# Patient Record
Sex: MALE | Race: WHITE | NOT HISPANIC OR LATINO | Employment: FULL TIME | URBAN - METROPOLITAN AREA
[De-identification: names, ages, dates, MRNs, and addresses within clinical notes are randomized per-mention and may not be internally consistent; named-entity substitution may affect disease eponyms.]

---

## 2023-08-18 PROBLEM — I10 BENIGN ESSENTIAL HYPERTENSION: Status: ACTIVE | Noted: 2023-08-18

## 2023-08-18 PROBLEM — B02.9 HERPES ZOSTER: Status: ACTIVE | Noted: 2023-08-18

## 2023-08-18 PROBLEM — M67.911 DYSFUNCTION OF RIGHT ROTATOR CUFF: Status: ACTIVE | Noted: 2023-08-18

## 2023-08-18 PROBLEM — K76.0 NONALCOHOLIC FATTY LIVER DISEASE: Status: ACTIVE | Noted: 2023-08-18

## 2023-08-18 PROBLEM — M75.101 ROTATOR CUFF TEAR, RIGHT: Status: ACTIVE | Noted: 2023-08-18

## 2023-08-18 PROBLEM — R53.83 MALAISE AND FATIGUE: Status: ACTIVE | Noted: 2023-08-18

## 2023-08-18 PROBLEM — G47.00 INSOMNIA: Status: ACTIVE | Noted: 2023-08-18

## 2023-08-18 PROBLEM — H90.41 SENSORINEURAL HEARING LOSS (SNHL) OF RIGHT EAR WITH UNRESTRICTED HEARING OF LEFT EAR: Status: ACTIVE | Noted: 2023-08-18

## 2023-08-18 PROBLEM — R53.81 MALAISE AND FATIGUE: Status: ACTIVE | Noted: 2023-08-18

## 2023-08-18 PROBLEM — F41.8 DEPRESSION WITH ANXIETY: Status: ACTIVE | Noted: 2023-08-18

## 2023-08-18 PROBLEM — K58.9 IBS (IRRITABLE BOWEL SYNDROME): Status: ACTIVE | Noted: 2023-08-18

## 2023-08-18 PROBLEM — N18.1 CHRONIC RENAL IMPAIRMENT, STAGE 1: Status: ACTIVE | Noted: 2023-08-18

## 2023-08-18 PROBLEM — N52.9 ORGANIC IMPOTENCE: Status: ACTIVE | Noted: 2023-08-18

## 2023-08-18 PROBLEM — M10.9 GOUT: Status: ACTIVE | Noted: 2023-08-18

## 2023-08-18 PROBLEM — H90.3 ASYMMETRIC SENSORINEURAL DEAFNESS: Status: ACTIVE | Noted: 2023-08-18

## 2023-08-18 PROBLEM — E78.00 HYPERCHOLESTEROLEMIA: Status: ACTIVE | Noted: 2023-08-18

## 2023-08-18 PROBLEM — E29.1 HYPOGONADISM MALE: Status: ACTIVE | Noted: 2023-08-18

## 2023-08-18 PROBLEM — K59.09 CHRONIC CONSTIPATION: Status: ACTIVE | Noted: 2023-08-18

## 2023-08-18 PROBLEM — B02.23 SHINGLES (HERPES ZOSTER) POLYNEUROPATHY: Status: ACTIVE | Noted: 2023-08-18

## 2023-08-18 RX ORDER — BUPROPION HYDROCHLORIDE 300 MG/1
300 TABLET ORAL DAILY
COMMUNITY

## 2023-08-18 RX ORDER — TRAZODONE HYDROCHLORIDE 50 MG/1
TABLET ORAL NIGHTLY PRN
COMMUNITY
Start: 2023-03-30 | End: 2023-10-27 | Stop reason: SDUPTHER

## 2023-08-18 RX ORDER — VILAZODONE HYDROCHLORIDE 40 MG/1
60 TABLET ORAL
COMMUNITY
Start: 2014-01-15

## 2023-08-18 RX ORDER — SENNOSIDES 8.6 MG/1
1 TABLET ORAL NIGHTLY
COMMUNITY
End: 2023-10-27 | Stop reason: ALTCHOICE

## 2023-08-18 RX ORDER — LINACLOTIDE 145 UG/1
CAPSULE, GELATIN COATED ORAL
COMMUNITY
Start: 2022-12-19 | End: 2023-10-27 | Stop reason: ALTCHOICE

## 2023-08-18 RX ORDER — GEMFIBROZIL 600 MG/1
600 TABLET, FILM COATED ORAL
COMMUNITY
Start: 2020-02-14 | End: 2023-10-27 | Stop reason: ALTCHOICE

## 2023-08-18 RX ORDER — OMEPRAZOLE 20 MG/1
20 CAPSULE, DELAYED RELEASE ORAL 2 TIMES DAILY
COMMUNITY

## 2023-08-18 RX ORDER — TESTOSTERONE CYPIONATE 200 MG/ML
200 INJECTION, SOLUTION INTRAMUSCULAR
COMMUNITY
Start: 2016-01-18 | End: 2024-03-07 | Stop reason: SDUPTHER

## 2023-08-18 RX ORDER — MINERAL OIL
1 ENEMA (ML) RECTAL DAILY
COMMUNITY
Start: 2022-08-10 | End: 2023-10-27 | Stop reason: ALTCHOICE

## 2023-08-18 RX ORDER — FLUTICASONE PROPIONATE 50 MCG
1 SPRAY, SUSPENSION (ML) NASAL 2 TIMES DAILY
COMMUNITY
Start: 2022-08-10

## 2023-08-18 RX ORDER — VILAZODONE HYDROCHLORIDE 40 MG/1
40 TABLET ORAL DAILY
COMMUNITY
End: 2023-10-27 | Stop reason: SDUPTHER

## 2023-08-18 RX ORDER — ZOLPIDEM TARTRATE 10 MG/1
1 TABLET ORAL NIGHTLY
COMMUNITY
Start: 2016-09-02 | End: 2023-10-27 | Stop reason: ALTCHOICE

## 2023-08-18 RX ORDER — TADALAFIL 20 MG/1
20 TABLET ORAL
COMMUNITY
Start: 2023-03-30

## 2023-08-18 RX ORDER — DAPSONE 75 MG/G
GEL TOPICAL
COMMUNITY
Start: 2022-09-29 | End: 2023-10-27 | Stop reason: ALTCHOICE

## 2023-08-18 RX ORDER — GABAPENTIN 100 MG/1
200 CAPSULE ORAL 3 TIMES DAILY
COMMUNITY
Start: 2022-07-29 | End: 2023-10-27 | Stop reason: ALTCHOICE

## 2023-09-27 LAB
ALANINE AMINOTRANSFERASE (SGPT) (U/L) IN SER/PLAS: 43 U/L (ref 10–52)
ALBUMIN (G/DL) IN SER/PLAS: 4.6 G/DL (ref 3.4–5)
ALKALINE PHOSPHATASE (U/L) IN SER/PLAS: 63 U/L (ref 33–120)
ANION GAP IN SER/PLAS: 15 MMOL/L (ref 10–20)
ASPARTATE AMINOTRANSFERASE (SGOT) (U/L) IN SER/PLAS: 28 U/L (ref 9–39)
BASOPHILS (10*3/UL) IN BLOOD BY AUTOMATED COUNT: 0.05 X10E9/L (ref 0–0.1)
BASOPHILS/100 LEUKOCYTES IN BLOOD BY AUTOMATED COUNT: 0.9 % (ref 0–2)
BILIRUBIN TOTAL (MG/DL) IN SER/PLAS: 1 MG/DL (ref 0–1.2)
CALCIUM (MG/DL) IN SER/PLAS: 10.6 MG/DL (ref 8.6–10.6)
CARBON DIOXIDE, TOTAL (MMOL/L) IN SER/PLAS: 26 MMOL/L (ref 21–32)
CHLORIDE (MMOL/L) IN SER/PLAS: 102 MMOL/L (ref 98–107)
CHOLESTEROL (MG/DL) IN SER/PLAS: 242 MG/DL (ref 0–199)
CHOLESTEROL IN HDL (MG/DL) IN SER/PLAS: 46.3 MG/DL
CHOLESTEROL/HDL RATIO: 5.2
CREATININE (MG/DL) IN SER/PLAS: 1.35 MG/DL (ref 0.5–1.3)
EOSINOPHILS (10*3/UL) IN BLOOD BY AUTOMATED COUNT: 0.27 X10E9/L (ref 0–0.7)
EOSINOPHILS/100 LEUKOCYTES IN BLOOD BY AUTOMATED COUNT: 5 % (ref 0–6)
ERYTHROCYTE DISTRIBUTION WIDTH (RATIO) BY AUTOMATED COUNT: 13.2 % (ref 11.5–14.5)
ERYTHROCYTE MEAN CORPUSCULAR HEMOGLOBIN CONCENTRATION (G/DL) BY AUTOMATED: 35.9 G/DL (ref 32–36)
ERYTHROCYTE MEAN CORPUSCULAR VOLUME (FL) BY AUTOMATED COUNT: 88 FL (ref 80–100)
ERYTHROCYTES (10*6/UL) IN BLOOD BY AUTOMATED COUNT: 5.31 X10E12/L (ref 4.5–5.9)
GFR MALE: 60 ML/MIN/1.73M2
GLUCOSE (MG/DL) IN SER/PLAS: 100 MG/DL (ref 74–99)
HEMATOCRIT (%) IN BLOOD BY AUTOMATED COUNT: 46.8 % (ref 41–52)
HEMOGLOBIN (G/DL) IN BLOOD: 16.8 G/DL (ref 13.5–17.5)
IMMATURE GRANULOCYTES/100 LEUKOCYTES IN BLOOD BY AUTOMATED COUNT: 0.4 % (ref 0–0.9)
LDL: 139 MG/DL (ref 0–99)
LEUKOCYTES (10*3/UL) IN BLOOD BY AUTOMATED COUNT: 5.4 X10E9/L (ref 4.4–11.3)
LYMPHOCYTES (10*3/UL) IN BLOOD BY AUTOMATED COUNT: 1.75 X10E9/L (ref 1.2–4.8)
LYMPHOCYTES/100 LEUKOCYTES IN BLOOD BY AUTOMATED COUNT: 32.2 % (ref 13–44)
MONOCYTES (10*3/UL) IN BLOOD BY AUTOMATED COUNT: 0.58 X10E9/L (ref 0.1–1)
MONOCYTES/100 LEUKOCYTES IN BLOOD BY AUTOMATED COUNT: 10.7 % (ref 2–10)
NEUTROPHILS (10*3/UL) IN BLOOD BY AUTOMATED COUNT: 2.77 X10E9/L (ref 1.2–7.7)
NEUTROPHILS/100 LEUKOCYTES IN BLOOD BY AUTOMATED COUNT: 50.8 % (ref 40–80)
NON HDL CHOLESTEROL: 196 MG/DL
NRBC (PER 100 WBCS) BY AUTOMATED COUNT: 0 /100 WBC (ref 0–0)
PLATELETS (10*3/UL) IN BLOOD AUTOMATED COUNT: 184 X10E9/L (ref 150–450)
POTASSIUM (MMOL/L) IN SER/PLAS: 4.3 MMOL/L (ref 3.5–5.3)
PROSTATE SPECIFIC AG (NG/ML) IN SER/PLAS: 1.16 NG/ML (ref 0–4)
PROTEIN TOTAL: 7.2 G/DL (ref 6.4–8.2)
SODIUM (MMOL/L) IN SER/PLAS: 139 MMOL/L (ref 136–145)
THYROTROPIN (MIU/L) IN SER/PLAS BY DETECTION LIMIT <= 0.05 MIU/L: 1 MIU/L (ref 0.44–3.98)
THYROXINE (T4) FREE (NG/DL) IN SER/PLAS: 1.14 NG/DL (ref 0.78–1.48)
TRIGLYCERIDE (MG/DL) IN SER/PLAS: 284 MG/DL (ref 0–149)
UREA NITROGEN (MG/DL) IN SER/PLAS: 16 MG/DL (ref 6–23)
VLDL: 57 MG/DL (ref 0–40)

## 2023-09-29 LAB
6-ACETYLMORPHINE: <25 NG/ML
7-AMINOCLONAZEPAM: <25 NG/ML
ALPHA-HYDROXYALPRAZOLAM: <25 NG/ML
ALPHA-HYDROXYMIDAZOLAM: <25 NG/ML
ALPRAZOLAM: <25 NG/ML
AMPHETAMINE (PRESENCE) IN URINE BY SCREEN METHOD: ABNORMAL
BARBITURATES PRESENCE IN URINE BY SCREEN METHOD: ABNORMAL
CANNABINOIDS IN URINE BY SCREEN METHOD: ABNORMAL
CHLORDIAZEPOXIDE: <25 NG/ML
CLONAZEPAM: <25 NG/ML
COCAINE (PRESENCE) IN URINE BY SCREEN METHOD: ABNORMAL
CODEINE: <50 NG/ML
CREATINE, URINE FOR DRUG: 125.6 MG/DL
DIAZEPAM: <25 NG/ML
DRUG SCREEN COMMENT URINE: ABNORMAL
EDDP: <25 NG/ML
FENTANYL CONFIRMATION, URINE: <2.5 NG/ML
HYDROCODONE: <25 NG/ML
HYDROMORPHONE: <25 NG/ML
LORAZEPAM: <25 NG/ML
METHADONE CONFIRMATION,URINE: <25 NG/ML
MIDAZOLAM: <25 NG/ML
MORPHINE URINE: <50 NG/ML
NORDIAZEPAM: <25 NG/ML
NORFENTANYL: <2.5 NG/ML
NORHYDROCODONE: <25 NG/ML
NOROXYCODONE: <25 NG/ML
O-DESMETHYLTRAMADOL: <50 NG/ML
OXAZEPAM: <25 NG/ML
OXYCODONE: <25 NG/ML
OXYMORPHONE: <25 NG/ML
PHENCYCLIDINE (PRESENCE) IN URINE BY SCREEN METHOD: ABNORMAL
TEMAZEPAM: <25 NG/ML
TRAMADOL: <50 NG/ML
ZOLPIDEM METABOLITE (ZCA): <25 NG/ML
ZOLPIDEM: <25 NG/ML

## 2023-10-02 LAB — 11-NOR-9-CARBOXY-THC, URN, QUANT: 370 NG/ML

## 2023-10-03 ENCOUNTER — TELEPHONE (OUTPATIENT)
Dept: PRIMARY CARE | Facility: CLINIC | Age: 58
End: 2023-10-03
Payer: COMMERCIAL

## 2023-10-27 ENCOUNTER — OFFICE VISIT (OUTPATIENT)
Dept: PRIMARY CARE | Facility: CLINIC | Age: 58
End: 2023-10-27
Payer: COMMERCIAL

## 2023-10-27 VITALS
HEIGHT: 68 IN | RESPIRATION RATE: 16 BRPM | SYSTOLIC BLOOD PRESSURE: 128 MMHG | HEART RATE: 72 BPM | DIASTOLIC BLOOD PRESSURE: 72 MMHG | WEIGHT: 202.82 LBS | BODY MASS INDEX: 30.74 KG/M2

## 2023-10-27 DIAGNOSIS — R53.83 MALAISE AND FATIGUE: ICD-10-CM

## 2023-10-27 DIAGNOSIS — N18.1 CHRONIC RENAL IMPAIRMENT, STAGE 1: ICD-10-CM

## 2023-10-27 DIAGNOSIS — I10 BENIGN ESSENTIAL HYPERTENSION: Primary | ICD-10-CM

## 2023-10-27 DIAGNOSIS — K76.0 NONALCOHOLIC FATTY LIVER DISEASE: ICD-10-CM

## 2023-10-27 DIAGNOSIS — E78.00 HYPERCHOLESTEROLEMIA: ICD-10-CM

## 2023-10-27 DIAGNOSIS — F51.01 PRIMARY INSOMNIA: ICD-10-CM

## 2023-10-27 DIAGNOSIS — R53.81 MALAISE AND FATIGUE: ICD-10-CM

## 2023-10-27 DIAGNOSIS — E29.1 HYPOGONADISM MALE: ICD-10-CM

## 2023-10-27 DIAGNOSIS — K59.09 CHRONIC CONSTIPATION: ICD-10-CM

## 2023-10-27 PROBLEM — R05.9 COUGH: Status: RESOLVED | Noted: 2023-10-27 | Resolved: 2023-10-27

## 2023-10-27 PROCEDURE — 3078F DIAST BP <80 MM HG: CPT | Performed by: INTERNAL MEDICINE

## 2023-10-27 PROCEDURE — 3074F SYST BP LT 130 MM HG: CPT | Performed by: INTERNAL MEDICINE

## 2023-10-27 PROCEDURE — 99214 OFFICE O/P EST MOD 30 MIN: CPT | Performed by: INTERNAL MEDICINE

## 2023-10-27 PROCEDURE — 1036F TOBACCO NON-USER: CPT | Performed by: INTERNAL MEDICINE

## 2023-10-27 RX ORDER — TRAZODONE HYDROCHLORIDE 50 MG/1
100 TABLET ORAL NIGHTLY PRN
Qty: 180 TABLET | Refills: 1 | Status: SHIPPED | OUTPATIENT
Start: 2023-10-27

## 2023-10-27 ASSESSMENT — ENCOUNTER SYMPTOMS
RESPIRATORY NEGATIVE: 1
CARDIOVASCULAR NEGATIVE: 1
ALLERGIC/IMMUNOLOGIC NEGATIVE: 1
MUSCULOSKELETAL NEGATIVE: 1
CONSTITUTIONAL NEGATIVE: 1
GASTROINTESTINAL NEGATIVE: 1
NEUROLOGICAL NEGATIVE: 1
EYES NEGATIVE: 1
PSYCHIATRIC NEGATIVE: 1
ENDOCRINE NEGATIVE: 1
HEMATOLOGIC/LYMPHATIC NEGATIVE: 1

## 2023-10-27 NOTE — ASSESSMENT & PLAN NOTE
HTN - hypertension well/controlled .Target BP < 130/80  achieved. Educate low salt diet and exercise with weight loss. Educate home self monitoring and diary keeping. Educate risks of elevate blood pressure and benefits of prompt treatment.

## 2023-10-27 NOTE — ASSESSMENT & PLAN NOTE
Fatigue - check CMP(metabolic panel and elctrolytes) , CBC(complete blood cell count), TSH(thyroid function). Insomnia may play a role and sleep studies(rule out sleep apnea) are recommended . Educate sleep hygiene. Consider anxiety disorder vs. depression. Consider Stress test, and 2DECHO.

## 2023-10-27 NOTE — ASSESSMENT & PLAN NOTE
Hypercholesterolemia - Monitor lipid profile and educate patient upon risks of high cholesterol and targets. Educate diet and change in lifestyle and increase in exercises -   and educate compliance with medication and diet.

## 2023-10-27 NOTE — ASSESSMENT & PLAN NOTE
Hypogonadism  - monitor  Testosterone level  - and supplement - injecting himself Testosterone 200 mg IM once a week Repeat on a weekly basis. Monitor Testosterone level and symptoms ( fatigue, decrease libido, muscle weakness). Educate the patient upon the risks of Stroke, heart attacks and possible venous thromboembolism with possible pulmonary embolism and even death associated with Testosterone treatment .  The patient understood the risks associated with Testosterone treatment and whishes to continue the Testosterone supplement and aggress with a plan to control tightly the Blood pressure and cholesterol and take a Baby aspirin 81 mg daily to prevent these complications. He enjoys the benefits of the Testosterone. Also check Prolactin and LH and FSH levels to rule out pituitary adenoma and prolactinoma.

## 2023-11-14 ENCOUNTER — OFFICE VISIT (OUTPATIENT)
Dept: PRIMARY CARE | Facility: CLINIC | Age: 58
End: 2023-11-14
Payer: COMMERCIAL

## 2023-11-14 ENCOUNTER — TELEPHONE (OUTPATIENT)
Dept: PRIMARY CARE | Facility: CLINIC | Age: 58
End: 2023-11-14

## 2023-11-14 VITALS
DIASTOLIC BLOOD PRESSURE: 80 MMHG | WEIGHT: 195 LBS | RESPIRATION RATE: 18 BRPM | BODY MASS INDEX: 29.55 KG/M2 | SYSTOLIC BLOOD PRESSURE: 120 MMHG | HEART RATE: 78 BPM | HEIGHT: 68 IN

## 2023-11-14 DIAGNOSIS — J98.01 COUGH DUE TO BRONCHOSPASM: ICD-10-CM

## 2023-11-14 DIAGNOSIS — R09.82 POSTNASAL DRIP: ICD-10-CM

## 2023-11-14 DIAGNOSIS — J01.00 ACUTE NON-RECURRENT MAXILLARY SINUSITIS: Primary | ICD-10-CM

## 2023-11-14 PROCEDURE — 3079F DIAST BP 80-89 MM HG: CPT | Performed by: INTERNAL MEDICINE

## 2023-11-14 PROCEDURE — 99213 OFFICE O/P EST LOW 20 MIN: CPT | Performed by: INTERNAL MEDICINE

## 2023-11-14 PROCEDURE — 3074F SYST BP LT 130 MM HG: CPT | Performed by: INTERNAL MEDICINE

## 2023-11-14 PROCEDURE — 1036F TOBACCO NON-USER: CPT | Performed by: INTERNAL MEDICINE

## 2023-11-14 RX ORDER — ZOLPIDEM TARTRATE 10 MG/1
10 TABLET ORAL DAILY PRN
COMMUNITY
Start: 2023-11-06 | End: 2023-12-06

## 2023-11-14 RX ORDER — BENZONATATE 200 MG/1
200 CAPSULE ORAL 3 TIMES DAILY PRN
Qty: 42 CAPSULE | Refills: 0 | Status: SHIPPED | OUTPATIENT
Start: 2023-11-14 | End: 2023-12-14

## 2023-11-14 RX ORDER — AMOXICILLIN AND CLAVULANATE POTASSIUM 875; 125 MG/1; MG/1
875 TABLET, FILM COATED ORAL 2 TIMES DAILY
Qty: 20 TABLET | Refills: 0 | Status: SHIPPED | OUTPATIENT
Start: 2023-11-14 | End: 2023-11-24

## 2023-11-14 NOTE — ASSESSMENT & PLAN NOTE
start Augmentin 875 mg BID for 10 days   And Flonase and Claritin and saline spray vitamin B and C and D

## 2023-11-14 NOTE — PROGRESS NOTES
"Subjective   Patient ID: Tobi Dominguez is a 58 y.o. male who presents for Cough (Cough, sinus congestion, fatigue).and Postnasal drip due to chronic sinusitis - days and Flonase I BID and Mucinex and Allegra 180- mg daily [      HPI     Review of Systems    Objective   Ht 1.727 m (5' 8\")   Wt 88.5 kg (195 lb)   BMI 29.65 kg/m²   Blood pressure 120/80, pulse 78, resp. rate 18, height 1.727 m (5' 8\"), weight 88.5 kg (195 lb).   Physical Exam    Assessment/Plan   Problem List Items Addressed This Visit             ICD-10-CM    Acute non-recurrent maxillary sinusitis - Primary J01.00     Sinusitis - start Augmentin 875 mg BID for 10 days   And Flonase and Claritin and saline spray vitamin B and C and D          Cough due to bronchospasm J98.01     Bronchitis with wheezing                                           Recommend:                                                                                                      mild  Pharyngitis,                                                                                                                                                                               Start: start Augmentin 875 mg BID for 10 days   And Flonase and Claritin and saline spray vitamin B and C and D -                                                                                                                                                             Asthma/COPD exacerbation       Allegra 180mg qD vs. Claritin 10 mg qD and Mucinex start Augmentin 875 mg BID for 10 days   And Flonase and Claritin and saline spray vitamin B and C and D                                                                                                                                             Cough  - start -  Avoid cold exposure and take vitamins C 500 and B complex  qD   start Augmentin 875 mg BID for 10 days   And Flonase and Claritin and saline spray vitamin B and C and D     Sinusitis - allergic vs. " infectious - start Allegra and Flonase I BID and Azithromycin 500 mg qD for 6 days. and avoid cold exposure.           Relevant Medications    amoxicillin-pot clavulanate (Augmentin) 875-125 mg tablet    Other Relevant Orders    XR chest 2 views    Postnasal drip R09.82     start Augmentin 875 mg BID for 10 days   And Flonase and Claritin and saline spray vitamin B and C and D

## 2023-11-14 NOTE — ASSESSMENT & PLAN NOTE
Bronchitis with wheezing                                           Recommend:                                                                                                      mild  Pharyngitis,                                                                                                                                                                               Start: start Augmentin 875 mg BID for 10 days   And Flonase and Claritin and saline spray vitamin B and C and D -                                                                                                                                                             Asthma/COPD exacerbation       Allegra 180mg qD vs. Claritin 10 mg qD and Mucinex start Augmentin 875 mg BID for 10 days   And Flonase and Claritin and saline spray vitamin B and C and D                                                                                                                                             Cough  - start -  Avoid cold exposure and take vitamins C 500 and B complex  qD   start Augmentin 875 mg BID for 10 days   And Flonase and Claritin and saline spray vitamin B and C and D     Sinusitis - allergic vs. infectious - start Allegra and Flonase I BID and Azithromycin 500 mg qD for 6 days. and avoid cold exposure.

## 2023-11-14 NOTE — ASSESSMENT & PLAN NOTE
Sinusitis - start Augmentin 875 mg BID for 10 days   And Flonase and Claritin and saline spray vitamin B and C and D

## 2023-12-04 ENCOUNTER — OFFICE VISIT (OUTPATIENT)
Dept: PRIMARY CARE | Facility: CLINIC | Age: 58
End: 2023-12-04
Payer: COMMERCIAL

## 2023-12-04 VITALS
WEIGHT: 195 LBS | DIASTOLIC BLOOD PRESSURE: 72 MMHG | SYSTOLIC BLOOD PRESSURE: 128 MMHG | HEART RATE: 72 BPM | HEIGHT: 68 IN | BODY MASS INDEX: 29.55 KG/M2 | RESPIRATION RATE: 16 BRPM

## 2023-12-04 DIAGNOSIS — F41.8 DEPRESSION WITH ANXIETY: ICD-10-CM

## 2023-12-04 DIAGNOSIS — G47.09 OTHER INSOMNIA: ICD-10-CM

## 2023-12-04 DIAGNOSIS — Z00.00 ANNUAL PHYSICAL EXAM: ICD-10-CM

## 2023-12-04 DIAGNOSIS — E29.1 HYPOGONADISM MALE: Primary | ICD-10-CM

## 2023-12-04 DIAGNOSIS — E78.00 HYPERCHOLESTEROLEMIA: ICD-10-CM

## 2023-12-04 DIAGNOSIS — K76.0 NONALCOHOLIC FATTY LIVER DISEASE: ICD-10-CM

## 2023-12-04 DIAGNOSIS — K58.1 IRRITABLE BOWEL SYNDROME WITH CONSTIPATION: ICD-10-CM

## 2023-12-04 PROCEDURE — 3074F SYST BP LT 130 MM HG: CPT | Performed by: INTERNAL MEDICINE

## 2023-12-04 PROCEDURE — 99396 PREV VISIT EST AGE 40-64: CPT | Performed by: INTERNAL MEDICINE

## 2023-12-04 PROCEDURE — 3078F DIAST BP <80 MM HG: CPT | Performed by: INTERNAL MEDICINE

## 2023-12-04 PROCEDURE — 1036F TOBACCO NON-USER: CPT | Performed by: INTERNAL MEDICINE

## 2023-12-04 RX ORDER — ONDANSETRON 8 MG/1
TABLET, ORALLY DISINTEGRATING ORAL
COMMUNITY
Start: 2023-12-01

## 2023-12-04 ASSESSMENT — PROMIS GLOBAL HEALTH SCALE
CARRYOUT_PHYSICAL_ACTIVITIES: COMPLETELY
CARRYOUT_SOCIAL_ACTIVITIES: EXCELLENT
RATE_QUALITY_OF_LIFE: VERY GOOD
RATE_AVERAGE_PAIN: 0
EMOTIONAL_PROBLEMS: RARELY
RATE_AVERAGE_FATIGUE: MILD
RATE_MENTAL_HEALTH: VERY GOOD
RATE_SOCIAL_SATISFACTION: VERY GOOD
RATE_GENERAL_HEALTH: VERY GOOD
RATE_PHYSICAL_HEALTH: VERY GOOD

## 2023-12-04 ASSESSMENT — ENCOUNTER SYMPTOMS
CONSTITUTIONAL NEGATIVE: 1
EYES NEGATIVE: 1
RESPIRATORY NEGATIVE: 1
CARDIOVASCULAR NEGATIVE: 1
PSYCHIATRIC NEGATIVE: 1
HEMATOLOGIC/LYMPHATIC NEGATIVE: 1
NEUROLOGICAL NEGATIVE: 1
GASTROINTESTINAL NEGATIVE: 1
ALLERGIC/IMMUNOLOGIC NEGATIVE: 1
ENDOCRINE NEGATIVE: 1
MUSCULOSKELETAL NEGATIVE: 1

## 2023-12-04 NOTE — PROGRESS NOTES
"Subjective   Patient ID: Tobi Dominguez is a 58 y.o. male who presents for Annual Exam (CPE).    HPI     Review of Systems   Constitutional: Negative.    HENT: Negative.     Eyes: Negative.    Respiratory: Negative.     Cardiovascular: Negative.    Gastrointestinal: Negative.    Endocrine: Negative.    Musculoskeletal: Negative.    Skin: Negative.    Allergic/Immunologic: Negative.    Neurological: Negative.    Hematological: Negative.    Psychiatric/Behavioral: Negative.     All other systems reviewed and are negative.      Objective   Ht 1.727 m (5' 8\")   Wt 88.5 kg (195 lb)   BMI 29.65 kg/m²   Blood pressure 128/72, pulse 72, resp. rate 16, height 1.727 m (5' 8\"), weight 88.5 kg (195 lb).   Physical Exam  Vitals and nursing note reviewed.   Constitutional:       Appearance: Normal appearance.   HENT:      Head: Normocephalic and atraumatic.      Right Ear: Tympanic membrane, ear canal and external ear normal.      Left Ear: Tympanic membrane, ear canal and external ear normal. There is no impacted cerumen.      Nose: Nose normal.      Mouth/Throat:      Mouth: Mucous membranes are moist.      Pharynx: Oropharynx is clear.   Eyes:      Extraocular Movements: Extraocular movements intact.      Conjunctiva/sclera: Conjunctivae normal.      Pupils: Pupils are equal, round, and reactive to light.   Cardiovascular:      Rate and Rhythm: Normal rate and regular rhythm.      Pulses: Normal pulses.      Heart sounds: Normal heart sounds. No murmur heard.  Pulmonary:      Effort: Pulmonary effort is normal. No respiratory distress.      Breath sounds: Normal breath sounds. No stridor. No wheezing, rhonchi or rales.   Chest:      Chest wall: No tenderness.   Abdominal:      General: Abdomen is flat. Bowel sounds are normal. There is no distension.      Palpations: Abdomen is soft. There is no mass.      Tenderness: There is no abdominal tenderness. There is no right CVA tenderness, left CVA tenderness, guarding or rebound. "      Hernia: No hernia is present.   Musculoskeletal:         General: Normal range of motion.      Cervical back: Normal range of motion and neck supple.   Skin:     General: Skin is warm.      Capillary Refill: Capillary refill takes less than 2 seconds.   Neurological:      General: No focal deficit present.      Mental Status: He is alert.      Cranial Nerves: No cranial nerve deficit.      Sensory: No sensory deficit.      Motor: No weakness.      Coordination: Coordination normal.      Gait: Gait normal.      Deep Tendon Reflexes: Reflexes normal.   Psychiatric:         Mood and Affect: Mood normal.         Behavior: Behavior normal. Behavior is cooperative.         Thought Content: Thought content normal.         Judgment: Judgment normal.         Assessment/Plan   Problem List Items Addressed This Visit             ICD-10-CM    Depression with anxiety F41.8     Anxiety Disorder - +/- Depresion.  the patient extensively. Prescribe /Wellbutrin /                                                   Hypercholesterolemia E78.00     Hypercholesterolemia - Monitor lipid profile and educate patient upon risks of high cholesterol and targets. Educate diet and change in lifestyle and increase in exercises -   and educate compliance with medication and diet.            Hypogonadism male - Primary E29.1     Hypogonadism  - monitor  Testosterone level  - and supplement - injecting himself Testosterone 200 mg IM once a week Repeat on a weekly basis. Monitor Testosterone level and symptoms ( fatigue, decrease libido, muscle weakness). Educate the patient upon the risks of Stroke, heart attacks and possible venous thromboembolism with possible pulmonary embolism and even death associated with Testosterone treatment .  The patient understood the risks associated with Testosterone treatment and whishes to continue the Testosterone supplement and aggress with a plan to control tightly the Blood pressure and cholesterol and  take a Baby aspirin 81 mg daily to prevent these complications. He enjoys the benefits of the Testosterone. Also check Prolactin and LH and FSH levels to rule out pituitary adenoma and prolactinoma.          Insomnia G47.00     Insomnia - Educate sleep hygiene, avoid naps. Avoid excessive coffee or chocolate. Consider relaxation techniques. Start initially Melatonin and Tylenol 500 mg at bed time. Refill Trazodone 50 mg daily and Zolpidem 10 mg PRN          Nonalcoholic fatty liver disease K76.0     Monitor liver function and educate diet due to fatty liver disease         IBS (irritable bowel syndrome) K58.9     Taking Linzess and it is improved feels better          Annual physical exam Z00.00     No recent hospitalizations.    All medications reviewed and reconciled by me the provider..  No use of controlled substances or opiates.    Family history, social history reviewed, no changes.    Patient does not smoke.    Patient does not drink.    Patient hydrates adequately daily.  Eats a well-balanced healthy diet.     Exercises adequately including walking and doing weightbearing exercises.    Patient denies any difficulty with memory or cognition.     Denies any difficulty with hearing.  Patient does not wear hearing aids.    No fall risk.  No recent falls.  Denies any difficulty walking.    Patient with no history of depression anxiety, denies any loss of interest, no feeling of sadness, no lack of motivation.    Patient is independent in all ADLs and IADLs.  Independent bathing, dressing, walking.  Takes care of own finances, shopping and cooking.     Preventive measures - Recommend ASAP : Last Colonoscopy within the last 5 years an it was normal no polyps  (educate patient risks of colon cancer) refer patient to GI specialist.    BPH - (Benign Prostatic Hypertrophy) refer patient to an Urologist for rectal exam and PSA check.     Had all the Covid vaccines and Shingrix

## 2023-12-04 NOTE — ASSESSMENT & PLAN NOTE
Anxiety Disorder - +/- Depresion.  the patient extensively. Prescribe /Wellbutrin /

## 2023-12-04 NOTE — ASSESSMENT & PLAN NOTE
Insomnia - Educate sleep hygiene, avoid naps. Avoid excessive coffee or chocolate. Consider relaxation techniques. Start initially Melatonin and Tylenol 500 mg at bed time. Refill Trazodone 50 mg daily and Zolpidem 10 mg PRN

## 2024-03-01 ENCOUNTER — TELEPHONE (OUTPATIENT)
Dept: PRIMARY CARE | Facility: CLINIC | Age: 59
End: 2024-03-01
Payer: COMMERCIAL

## 2024-03-01 DIAGNOSIS — J98.01 COUGH DUE TO BRONCHOSPASM: Primary | ICD-10-CM

## 2024-03-01 RX ORDER — AZITHROMYCIN 500 MG/1
500 TABLET, FILM COATED ORAL DAILY
Qty: 5 TABLET | Refills: 0 | Status: SHIPPED | OUTPATIENT
Start: 2024-03-01 | End: 2024-03-06

## 2024-03-07 ENCOUNTER — OFFICE VISIT (OUTPATIENT)
Dept: PRIMARY CARE | Facility: CLINIC | Age: 59
End: 2024-03-07
Payer: COMMERCIAL

## 2024-03-07 VITALS
HEIGHT: 68 IN | SYSTOLIC BLOOD PRESSURE: 110 MMHG | BODY MASS INDEX: 28.04 KG/M2 | WEIGHT: 185 LBS | HEART RATE: 65 BPM | DIASTOLIC BLOOD PRESSURE: 65 MMHG | RESPIRATION RATE: 16 BRPM

## 2024-03-07 DIAGNOSIS — I10 BENIGN ESSENTIAL HYPERTENSION: ICD-10-CM

## 2024-03-07 DIAGNOSIS — E29.1 HYPOGONADISM MALE: ICD-10-CM

## 2024-03-07 DIAGNOSIS — J98.01 COUGH DUE TO BRONCHOSPASM: Primary | ICD-10-CM

## 2024-03-07 PROCEDURE — 3078F DIAST BP <80 MM HG: CPT | Performed by: INTERNAL MEDICINE

## 2024-03-07 PROCEDURE — 3074F SYST BP LT 130 MM HG: CPT | Performed by: INTERNAL MEDICINE

## 2024-03-07 PROCEDURE — 99213 OFFICE O/P EST LOW 20 MIN: CPT | Performed by: INTERNAL MEDICINE

## 2024-03-07 PROCEDURE — 1036F TOBACCO NON-USER: CPT | Performed by: INTERNAL MEDICINE

## 2024-03-07 RX ORDER — AMOXICILLIN AND CLAVULANATE POTASSIUM 875; 125 MG/1; MG/1
875 TABLET, FILM COATED ORAL 2 TIMES DAILY
Qty: 20 TABLET | Refills: 0 | Status: SHIPPED | OUTPATIENT
Start: 2024-03-07 | End: 2024-03-17

## 2024-03-07 RX ORDER — ZOLPIDEM TARTRATE 10 MG/1
10 TABLET ORAL DAILY PRN
COMMUNITY
Start: 2023-12-28 | End: 2024-04-03

## 2024-03-07 RX ORDER — TESTOSTERONE CYPIONATE 200 MG/ML
200 INJECTION, SOLUTION INTRAMUSCULAR
Qty: 10 ML | Refills: 2 | Status: SHIPPED | OUTPATIENT
Start: 2024-03-07

## 2024-03-07 RX ORDER — PREDNISONE 10 MG/1
TABLET ORAL
Qty: 30 TABLET | Refills: 0 | Status: SHIPPED | OUTPATIENT
Start: 2024-03-07 | End: 2024-03-16

## 2024-03-07 RX ORDER — BENZONATATE 200 MG/1
200 CAPSULE ORAL 3 TIMES DAILY PRN
Qty: 42 CAPSULE | Refills: 0 | Status: SHIPPED | OUTPATIENT
Start: 2024-03-07 | End: 2024-04-06

## 2024-03-07 ASSESSMENT — ENCOUNTER SYMPTOMS
CARDIOVASCULAR NEGATIVE: 1
COUGH: 1
EYES NEGATIVE: 1
GASTROINTESTINAL NEGATIVE: 1
ALLERGIC/IMMUNOLOGIC NEGATIVE: 1
NEUROLOGICAL NEGATIVE: 1
PSYCHIATRIC NEGATIVE: 1
HEMATOLOGIC/LYMPHATIC NEGATIVE: 1
CONSTITUTIONAL NEGATIVE: 1
MUSCULOSKELETAL NEGATIVE: 1
ENDOCRINE NEGATIVE: 1

## 2024-03-07 NOTE — PROGRESS NOTES
"Subjective   Patient ID: Tobi Dominguez is a 58 y.o. male who presents for Cough (Cough x3-4 weeks, no improvement after azithromycin ). No check for Covid or Flu  - took on course of azithromycin and the cough recurs and took a second \"Z \" pack and sinuses are stuill congested and persistent cough form postnasal drip     Cough         Review of Systems   Constitutional: Negative.    HENT: Negative.     Eyes: Negative.    Respiratory:  Positive for cough.    Cardiovascular: Negative.    Gastrointestinal: Negative.    Endocrine: Negative.    Musculoskeletal: Negative.    Skin: Negative.    Allergic/Immunologic: Negative.    Neurological: Negative.    Hematological: Negative.    Psychiatric/Behavioral: Negative.     All other systems reviewed and are negative.      Objective   Ht 1.727 m (5' 8\")   Wt 83.9 kg (185 lb)   BMI 28.13 kg/m²   Blood pressure 110/65, pulse 65, resp. rate 16, height 1.727 m (5' 8\"), weight 83.9 kg (185 lb).   Physical Exam  Vitals and nursing note reviewed.   Constitutional:       Appearance: Normal appearance.   HENT:      Head: Normocephalic and atraumatic.      Right Ear: Tympanic membrane, ear canal and external ear normal.      Left Ear: Tympanic membrane, ear canal and external ear normal. There is no impacted cerumen.      Nose: Nose normal.      Mouth/Throat:      Mouth: Mucous membranes are moist.      Pharynx: Oropharynx is clear.   Eyes:      Extraocular Movements: Extraocular movements intact.      Conjunctiva/sclera: Conjunctivae normal.      Pupils: Pupils are equal, round, and reactive to light.   Cardiovascular:      Rate and Rhythm: Normal rate and regular rhythm.      Pulses: Normal pulses.      Heart sounds: Normal heart sounds. No murmur heard.  Pulmonary:      Effort: Pulmonary effort is normal. No respiratory distress.      Breath sounds: Normal breath sounds. No stridor. No wheezing, rhonchi or rales.   Chest:      Chest wall: No tenderness.   Abdominal:      General: " Abdomen is flat. Bowel sounds are normal. There is no distension.      Palpations: Abdomen is soft. There is no mass.      Tenderness: There is no abdominal tenderness. There is no right CVA tenderness, left CVA tenderness, guarding or rebound.      Hernia: No hernia is present.   Musculoskeletal:         General: Normal range of motion.      Cervical back: Normal range of motion and neck supple.   Skin:     General: Skin is warm.      Capillary Refill: Capillary refill takes less than 2 seconds.   Neurological:      General: No focal deficit present.      Mental Status: He is alert.      Cranial Nerves: No cranial nerve deficit.      Sensory: No sensory deficit.      Motor: No weakness.      Coordination: Coordination normal.      Gait: Gait normal.      Deep Tendon Reflexes: Reflexes normal.   Psychiatric:         Mood and Affect: Mood normal.         Behavior: Behavior normal. Behavior is cooperative.         Thought Content: Thought content normal.         Judgment: Judgment normal.         Assessment/Plan   Problem List Items Addressed This Visit             ICD-10-CM    Benign essential hypertension I10    Hypogonadism male E29.1    Relevant Medications    testosterone cypionate (Depo-Testosterone) 200 mg/mL injection    Cough due to bronchospasm - Primary J98.01     Bronchitis with wheezing                                           Recommend:                                                                                                      mild  Pharyngitis,                                                                                                                                                                               Start: start Augmentin 875 mg BID for 10 days   And Flonase and Claritin and saline spray vitamin B and C and D - and Prednisone 10 mg II BID for 3 days and II qD for 3 days and I qD for 3 days -                                                                                                                                                                Asthma/COPD exacerbation       Allegra 180mg qD vs. Claritin 10 mg qD and Mucinex start Augmentin 875 mg BID for 10 days and Prednisone 10 mg II BID for 3 days and II qD for 3 days and I qD for 3 days -     And Flonase and Claritin and saline spray vitamin B and C and D                                                                                                                                              Cough  - start -  Avoid cold exposure and take vitamins C 500 and B complex  qD   start Augmentin 875 mg BID for 10 days   And Flonase and Claritin and saline spray vitamin B and C and D      Sinusitis - allergic vs. infectious - start Allegra and Flonase I BID and and Prednisone 10 mg II BID for 3 days and II qD for 3 days and I qD for 3 days -   s. and avoid cold exposure.              Depression with anxiety F41.8        Anxiety Disorder - +/- Depresion.  the patient extensively. Prescribe /Wellbutrin /                                                     Hypercholesterolemia E78.00       Hypercholesterolemia - Monitor lipid profile and educate patient upon risks of high cholesterol and targets. Educate diet and change in lifestyle and increase in exercises -   and educate compliance with medication and diet.              Hypogonadism male - Primary E29.1       Hypogonadism  - monitor  Testosterone level  - and supplement - injecting himself Testosterone 200 mg IM once a week Repeat on a weekly basis. Monitor Testosterone level and symptoms ( fatigue, decrease libido, muscle weakness). Educate the patient upon the risks of Stroke, heart attacks and possible venous thromboembolism with possible pulmonary embolism and even death associated with Testosterone treatment .  The patient understood the risks associated with Testosterone treatment and whishes to continue the Testosterone supplement and aggress with a plan to control  tightly the Blood pressure and cholesterol and take a Baby aspirin 81 mg daily to prevent these complications. He enjoys the benefits of the Testosterone. Also check Prolactin and LH and FSH levels to rule out pituitary adenoma and prolactinoma.            Insomnia G47.00       Insomnia - Educate sleep hygiene, avoid naps. Avoid excessive coffee or chocolate. Consider relaxation techniques. Start initially Melatonin and Tylenol 500 mg at bed time. Refill Trazodone 50 mg daily and Zolpidem 10 mg PRN            Nonalcoholic fatty liver disease K76.0       Monitor liver function and educate diet due to fatty liver disease           IBS (irritable bowel syndrome) K58.9       Taking Linzess and it is improved feels better                                       Immunizations/Injections     Flu vaccine (IIV4), preservative free *Check age/dose*10/6/2013  Influenza, seasonal, injectable9/26/2011  Moderna SARS-CoV-2 [id number]/7/2021, 3/10/2021, 2/10/2021

## 2024-03-07 NOTE — ASSESSMENT & PLAN NOTE
Bronchitis with wheezing                                           Recommend:                                                                                                      mild  Pharyngitis,                                                                                                                                                                               Start: start Augmentin 875 mg BID for 10 days   And Flonase and Claritin and saline spray vitamin B and C and D - and Prednisone 10 mg II BID for 3 days and II qD for 3 days and I qD for 3 days -                                                                                                                                                               Asthma/COPD exacerbation       Allegra 180mg qD vs. Claritin 10 mg qD and Mucinex start Augmentin 875 mg BID for 10 days and Prednisone 10 mg II BID for 3 days and II qD for 3 days and I qD for 3 days -     And Flonase and Claritin and saline spray vitamin B and C and D                                                                                                                                              Cough  - start -  Avoid cold exposure and take vitamins C 500 and B complex  qD   start Augmentin 875 mg BID for 10 days   And Flonase and Claritin and saline spray vitamin B and C and D      Sinusitis - allergic vs. infectious - start Allegra and Flonase I BID and and Prednisone 10 mg II BID for 3 days and II qD for 3 days and I qD for 3 days -   s. and avoid cold exposure.

## 2024-03-12 ENCOUNTER — OFFICE VISIT (OUTPATIENT)
Dept: PRIMARY CARE | Facility: CLINIC | Age: 59
End: 2024-03-12
Payer: COMMERCIAL

## 2024-03-12 DIAGNOSIS — Z71.9 ENCOUNTER FOR CONSULTATION: Primary | ICD-10-CM

## 2024-03-12 PROCEDURE — UHSMG PR UH SELECT MEET AND GREET: Performed by: INTERNAL MEDICINE

## 2024-03-12 PROCEDURE — 1036F TOBACCO NON-USER: CPT | Performed by: INTERNAL MEDICINE

## 2024-03-12 NOTE — PROGRESS NOTES
Patient presents for meet/greet  Information regarding practice reviewed.  Recommend wellness exam in May/June if patient joins    Kranthi Ríos MD

## 2024-03-18 ENCOUNTER — TELEPHONE (OUTPATIENT)
Dept: PRIMARY CARE | Facility: CLINIC | Age: 59
End: 2024-03-18
Payer: COMMERCIAL

## 2024-03-21 ENCOUNTER — OFFICE VISIT (OUTPATIENT)
Dept: PRIMARY CARE | Facility: CLINIC | Age: 59
End: 2024-03-21
Payer: COMMERCIAL

## 2024-03-21 VITALS
SYSTOLIC BLOOD PRESSURE: 110 MMHG | DIASTOLIC BLOOD PRESSURE: 72 MMHG | RESPIRATION RATE: 16 BRPM | HEIGHT: 68 IN | HEART RATE: 82 BPM | BODY MASS INDEX: 28.04 KG/M2 | WEIGHT: 185 LBS

## 2024-03-21 DIAGNOSIS — J98.01 COUGH DUE TO BRONCHOSPASM: Primary | ICD-10-CM

## 2024-03-21 PROBLEM — J02.9 INFECTIVE PHARYNGITIS: Status: RESOLVED | Noted: 2023-11-14 | Resolved: 2024-03-21

## 2024-03-21 PROBLEM — J20.9 ACUTE BRONCHITIS: Status: ACTIVE | Noted: 2024-03-21

## 2024-03-21 PROBLEM — L84 CALLUS OF FOOT: Status: RESOLVED | Noted: 2024-03-21 | Resolved: 2024-03-21

## 2024-03-21 PROBLEM — M25.519 SHOULDER PAIN: Status: RESOLVED | Noted: 2024-03-21 | Resolved: 2024-03-21

## 2024-03-21 PROBLEM — M75.100 TEAR OF ROTATOR CUFF: Status: RESOLVED | Noted: 2024-03-21 | Resolved: 2024-03-21

## 2024-03-21 PROBLEM — H60.509 ACUTE OTITIS EXTERNA: Status: ACTIVE | Noted: 2024-03-21

## 2024-03-21 PROCEDURE — 99213 OFFICE O/P EST LOW 20 MIN: CPT | Performed by: INTERNAL MEDICINE

## 2024-03-21 PROCEDURE — 3078F DIAST BP <80 MM HG: CPT | Performed by: INTERNAL MEDICINE

## 2024-03-21 PROCEDURE — 3074F SYST BP LT 130 MM HG: CPT | Performed by: INTERNAL MEDICINE

## 2024-03-21 PROCEDURE — 1036F TOBACCO NON-USER: CPT | Performed by: INTERNAL MEDICINE

## 2024-03-21 RX ORDER — DOXYCYCLINE 100 MG/1
100 CAPSULE ORAL 2 TIMES DAILY
Qty: 28 CAPSULE | Refills: 0 | Status: SHIPPED | OUTPATIENT
Start: 2024-03-21 | End: 2024-04-04

## 2024-03-21 ASSESSMENT — ENCOUNTER SYMPTOMS
GASTROINTESTINAL NEGATIVE: 1
PSYCHIATRIC NEGATIVE: 1
CARDIOVASCULAR NEGATIVE: 1
ENDOCRINE NEGATIVE: 1
CONSTITUTIONAL NEGATIVE: 1
EYES NEGATIVE: 1
MUSCULOSKELETAL NEGATIVE: 1
NEUROLOGICAL NEGATIVE: 1
RESPIRATORY NEGATIVE: 1
HEMATOLOGIC/LYMPHATIC NEGATIVE: 1
ALLERGIC/IMMUNOLOGIC NEGATIVE: 1

## 2024-03-21 NOTE — PROGRESS NOTES
"Subjective   Patient ID: Tobi oDminguez is a 58 y.o. male who presents for Nasal Congestion (Still has nasal congestion and cough).    HPI     Review of Systems   Constitutional: Negative.    HENT: Negative.     Eyes: Negative.    Respiratory: Negative.     Cardiovascular: Negative.    Gastrointestinal: Negative.    Endocrine: Negative.    Musculoskeletal: Negative.    Skin: Negative.    Allergic/Immunologic: Negative.    Neurological: Negative.    Hematological: Negative.    Psychiatric/Behavioral: Negative.     All other systems reviewed and are negative.      Objective   Ht 1.727 m (5' 8\")   Wt 83.9 kg (185 lb)   BMI 28.13 kg/m²   Blood pressure 110/72, pulse 82, resp. rate 16, height 1.727 m (5' 8\"), weight 83.9 kg (185 lb).   Physical Exam  Vitals and nursing note reviewed.   Constitutional:       Appearance: Normal appearance.   HENT:      Head: Normocephalic and atraumatic.      Right Ear: Tympanic membrane, ear canal and external ear normal.      Left Ear: Tympanic membrane, ear canal and external ear normal. There is no impacted cerumen.      Nose: Nose normal.      Mouth/Throat:      Mouth: Mucous membranes are moist.      Pharynx: Oropharynx is clear.   Eyes:      Extraocular Movements: Extraocular movements intact.      Conjunctiva/sclera: Conjunctivae normal.      Pupils: Pupils are equal, round, and reactive to light.   Cardiovascular:      Rate and Rhythm: Normal rate and regular rhythm.      Pulses: Normal pulses.      Heart sounds: Normal heart sounds. No murmur heard.  Pulmonary:      Effort: Pulmonary effort is normal. No respiratory distress.      Breath sounds: Normal breath sounds. No stridor. No wheezing, rhonchi or rales.   Chest:      Chest wall: No tenderness.   Abdominal:      General: Abdomen is flat. Bowel sounds are normal. There is no distension.      Palpations: Abdomen is soft. There is no mass.      Tenderness: There is no abdominal tenderness. There is no right CVA tenderness, " left CVA tenderness, guarding or rebound.      Hernia: No hernia is present.   Musculoskeletal:         General: Normal range of motion.      Cervical back: Normal range of motion and neck supple.   Skin:     General: Skin is warm.      Capillary Refill: Capillary refill takes less than 2 seconds.   Neurological:      General: No focal deficit present.      Mental Status: He is alert.      Cranial Nerves: No cranial nerve deficit.      Sensory: No sensory deficit.      Motor: No weakness.      Coordination: Coordination normal.      Gait: Gait normal.      Deep Tendon Reflexes: Reflexes normal.   Psychiatric:         Mood and Affect: Mood normal.         Behavior: Behavior normal. Behavior is cooperative.         Thought Content: Thought content normal.         Judgment: Judgment normal.         Assessment/Plan   Problem List Items Addressed This Visit             ICD-10-CM    Cough due to bronchospasm - Primary J98.01    Relevant Medications    doxycycline (Vibramycin) 100 mg capsule          Benign essential hypertension I10      Hypogonadism male E29.1     Relevant Medications     testosterone cypionate (Depo-Testosterone) 200 mg/mL injection     Cough due to bronchospasm - Primary J98.01       Bronchitis with wheezing                                           Recommend:                                                                                                      mild  Pharyngitis,                                                                                                                                                                               Start: start Doxycycline 100 mg BID for 14 days    And Flonase and Claritin and saline spray vitamin B and C and D - and inhalers                                                                                                                                                                                                                                                                                                          Cough  - start -  Avoid cold exposure and take vitamins C 500 and B complex  qD   start Doxycycline 100 mg BID for 14 days    And Flonase and Claritin and saline spray vitamin B and C and D      Sinusitis - allergic vs. infectious - start Allegra and Flonase I BID and and Prednisone 10 mg II BID for 3 days and II qD for 3 days and I qD for 3 days -   s. and avoid cold exposure.                       Depression with anxiety F41.8         Anxiety Disorder - +/- Depresion.  the patient extensively. Prescribe /Wellbutrin /                                                     Hypercholesterolemia E78.00       Hypercholesterolemia - Monitor lipid profile and educate patient upon risks of high cholesterol and targets. Educate diet and change in lifestyle and increase in exercises -   and educate compliance with medication and diet.              Hypogonadism male - Primary E29.1       Hypogonadism  - monitor  Testosterone level  - and supplement - injecting himself Testosterone 200 mg IM once a week Repeat on a weekly basis. Monitor Testosterone level and symptoms ( fatigue, decrease libido, muscle weakness). Educate the patient upon the risks of Stroke, heart attacks and possible venous thromboembolism with possible pulmonary embolism and even death associated with Testosterone treatment .  The patient understood the risks associated with Testosterone treatment and whishes to continue the Testosterone supplement and aggress with a plan to control tightly the Blood pressure and cholesterol and take a Baby aspirin 81 mg daily to prevent these complications. He enjoys the benefits of the Testosterone. Also check Prolactin and LH and FSH levels to rule out pituitary adenoma and prolactinoma.            Insomnia G47.00       Insomnia - Educate sleep hygiene, avoid naps. Avoid excessive coffee or chocolate. Consider relaxation techniques. Start initially  Melatonin and Tylenol 500 mg at bed time. Refill Trazodone 50 mg daily and Zolpidem 10 mg PRN            Nonalcoholic fatty liver disease K76.0       Monitor liver function and educate diet due to fatty liver disease           IBS (irritable bowel syndrome) K58.9       Taking Linzess and it is improved feels better                                           Immunizations/Injections      Flu vaccine (IIV4), preservative free *Check age/dose*10/6/2013  Influenza, seasonal, injectable9/26/2011  Moderna SARS-CoV-2 Mjhealzased73/7/2021, 3/10/2021, 2/10/2021

## 2024-03-22 ENCOUNTER — HOSPITAL ENCOUNTER (OUTPATIENT)
Dept: RADIOLOGY | Facility: CLINIC | Age: 59
Discharge: HOME | End: 2024-03-22
Payer: COMMERCIAL

## 2024-03-22 DIAGNOSIS — J98.01 COUGH DUE TO BRONCHOSPASM: ICD-10-CM

## 2024-03-22 PROCEDURE — 71046 X-RAY EXAM CHEST 2 VIEWS: CPT

## 2024-03-22 PROCEDURE — 71046 X-RAY EXAM CHEST 2 VIEWS: CPT | Performed by: STUDENT IN AN ORGANIZED HEALTH CARE EDUCATION/TRAINING PROGRAM

## 2024-07-01 DIAGNOSIS — K59.09 CHRONIC CONSTIPATION: ICD-10-CM

## 2024-09-12 ENCOUNTER — LAB (OUTPATIENT)
Dept: LAB | Facility: LAB | Age: 59
End: 2024-09-12
Payer: COMMERCIAL

## 2024-09-12 ENCOUNTER — OFFICE VISIT (OUTPATIENT)
Dept: UROLOGY | Facility: HOSPITAL | Age: 59
End: 2024-09-12
Payer: COMMERCIAL

## 2024-09-12 DIAGNOSIS — R39.11 URINARY HESITANCY: ICD-10-CM

## 2024-09-12 DIAGNOSIS — N40.0 BENIGN PROSTATIC HYPERPLASIA, UNSPECIFIED WHETHER LOWER URINARY TRACT SYMPTOMS PRESENT: ICD-10-CM

## 2024-09-12 DIAGNOSIS — R33.8 BENIGN PROSTATIC HYPERPLASIA WITH URINARY RETENTION: Primary | ICD-10-CM

## 2024-09-12 DIAGNOSIS — N40.1 BENIGN PROSTATIC HYPERPLASIA WITH URINARY RETENTION: Primary | ICD-10-CM

## 2024-09-12 LAB
ANION GAP SERPL CALC-SCNC: 13 MMOL/L (ref 10–20)
BUN SERPL-MCNC: 14 MG/DL (ref 6–23)
CALCIUM SERPL-MCNC: 9.7 MG/DL (ref 8.6–10.3)
CHLORIDE SERPL-SCNC: 104 MMOL/L (ref 98–107)
CO2 SERPL-SCNC: 27 MMOL/L (ref 21–32)
CREAT SERPL-MCNC: 1.24 MG/DL (ref 0.5–1.3)
EGFRCR SERPLBLD CKD-EPI 2021: 67 ML/MIN/1.73M*2
GLUCOSE SERPL-MCNC: 82 MG/DL (ref 74–99)
POTASSIUM SERPL-SCNC: 4.5 MMOL/L (ref 3.5–5.3)
SODIUM SERPL-SCNC: 139 MMOL/L (ref 136–145)

## 2024-09-12 PROCEDURE — 51798 US URINE CAPACITY MEASURE: CPT | Performed by: UROLOGY

## 2024-09-12 PROCEDURE — 80048 BASIC METABOLIC PNL TOTAL CA: CPT

## 2024-09-12 PROCEDURE — 36415 COLL VENOUS BLD VENIPUNCTURE: CPT

## 2024-09-12 PROCEDURE — 99214 OFFICE O/P EST MOD 30 MIN: CPT | Performed by: UROLOGY

## 2024-09-12 PROCEDURE — 99204 OFFICE O/P NEW MOD 45 MIN: CPT | Performed by: UROLOGY

## 2024-09-12 RX ORDER — TAMSULOSIN HYDROCHLORIDE 0.4 MG/1
0.4 CAPSULE ORAL DAILY
Qty: 30 CAPSULE | Refills: 11 | Status: SHIPPED | OUTPATIENT
Start: 2024-09-12 | End: 2025-09-12

## 2024-09-12 NOTE — PROGRESS NOTES
HPI    59 y.o. male being seen with the following problem list:    Problem list:  Urinary retention, asymptoamtic  On T 200 IM    Referred by Dr. Alamo     09/12/24 - PVR 686cc. He voices concerns with hesitancy, and retention, reports occasional accidents while he's getting ready to leave the house. Overall not too bothered with his urination. No urinary medications.         Lab Results   Component Value Date    PSA 1.16 09/27/2023    PSA 0.80 10/05/2020    PSA 1.01 07/02/2018         Current Medications:  Current Outpatient Medications   Medication Sig Dispense Refill    buPROPion XL (Wellbutrin XL) 300 mg 24 hr tablet Take 1 tablet (300 mg) by mouth once daily. Do not crush, chew, or split.      fluticasone (Flonase) 50 mcg/actuation nasal spray Administer 1 spray into affected nostril(s) 2 times a day.      linaCLOtide (Linzess) 290 mcg capsule Take 1 capsule (290 mcg) by mouth once daily in the morning. Take before meals. Do not crush or chew. 90 capsule 1    omeprazole (PriLOSEC) 20 mg DR capsule Take 1 capsule (20 mg) by mouth 2 times a day. Do not crush or chew.      ondansetron ODT (Zofran-ODT) 8 mg disintegrating tablet       tadalafil 20 mg tablet Take 1 tablet (20 mg) by mouth every 3rd day if needed.      testosterone cypionate (Depo-Testosterone) 200 mg/mL injection Inject 1 mL (200 mg) into the muscle 1 (one) time per week. 10 mL 2    traZODone (Desyrel) 50 mg tablet Take 2 tablets (100 mg) by mouth as needed at bedtime for sleep. 180 tablet 1    vilazodone (Viibryd) 40 mg tablet Take 1.5 tablets (60 mg) by mouth once daily with a meal.       No current facility-administered medications for this visit.        Active Problems:  Tobi Dominguez is a 59 y.o. male with the following Problems and Medications.  Patient Active Problem List   Diagnosis    Asymmetric sensorineural deafness    Benign essential hypertension    Chronic renal impairment, stage 1    Depression with anxiety    Dysfunction of right  rotator cuff    Herpes zoster    Hypercholesterolemia    Hypogonadism male    Insomnia    Malaise and fatigue    Nonalcoholic fatty liver disease    Organic impotence    Rotator cuff tear, right    Sensorineural hearing loss (SNHL) of right ear with unrestricted hearing of left ear    Shingles (herpes zoster) polyneuropathy    Chronic constipation    Gout    IBS (irritable bowel syndrome)    Acute non-recurrent maxillary sinusitis    Cough due to bronchospasm    Postnasal drip    Annual physical exam    Acute bronchitis    Acute otitis externa     Current Outpatient Medications   Medication Sig Dispense Refill    buPROPion XL (Wellbutrin XL) 300 mg 24 hr tablet Take 1 tablet (300 mg) by mouth once daily. Do not crush, chew, or split.      fluticasone (Flonase) 50 mcg/actuation nasal spray Administer 1 spray into affected nostril(s) 2 times a day.      linaCLOtide (Linzess) 290 mcg capsule Take 1 capsule (290 mcg) by mouth once daily in the morning. Take before meals. Do not crush or chew. 90 capsule 1    omeprazole (PriLOSEC) 20 mg DR capsule Take 1 capsule (20 mg) by mouth 2 times a day. Do not crush or chew.      ondansetron ODT (Zofran-ODT) 8 mg disintegrating tablet       tadalafil 20 mg tablet Take 1 tablet (20 mg) by mouth every 3rd day if needed.      testosterone cypionate (Depo-Testosterone) 200 mg/mL injection Inject 1 mL (200 mg) into the muscle 1 (one) time per week. 10 mL 2    traZODone (Desyrel) 50 mg tablet Take 2 tablets (100 mg) by mouth as needed at bedtime for sleep. 180 tablet 1    vilazodone (Viibryd) 40 mg tablet Take 1.5 tablets (60 mg) by mouth once daily with a meal.       No current facility-administered medications for this visit.       PMH:  Past Medical History:   Diagnosis Date    Callus of foot 03/21/2024    Cough 10/27/2023    Infective pharyngitis 11/14/2023    Low serum testosterone level 10/12/2012    Personal history of other specified conditions 01/15/2014    History of insomnia     Presbyopia 2014    Shoulder pain 2024    Tear of rotator cuff 2024       PSH:  Past Surgical History:   Procedure Laterality Date    SINUS SURGERY  01/15/2014    Sinus Surgery       FMH:  Family History   Problem Relation Name Age of Onset    Lung cancer Mother      Stomach cancer Maternal Grandfather      Heart attack Paternal Grandfather      Stroke Paternal Grandfather         SHx:  Social History     Tobacco Use    Smoking status: Former     Current packs/day: 0.00     Types: Cigarettes     Quit date:      Years since quittin.7    Smokeless tobacco: Never   Substance Use Topics    Alcohol use: Yes     Comment: social    Drug use: Yes     Types: Marijuana       Allergies:  No Known Allergies      Assessment/Plan  59 year old male with severe urinary rentention and hesitancy. Discussed initiating with BMP and renal US, then proceeding with a cystoscopy to assess the bladder and prostate. If any of the lab work suggest damaging of kidneys, will consider catheterizing at that point prior to proceeding with further testing to preserve kidney function. Will also start on flomax. Briefly discussed surgical management depending on the cysto findings. He understands this and agrees with the course plan.     Will follow up with results.        Scribe Attestation  By signing my name below, I, Parris Woods, attest that this documentation  has been prepared under the direction and in the presence of Juni Lopez MD.

## 2024-09-30 DIAGNOSIS — Z00.00 ANNUAL PHYSICAL EXAM: ICD-10-CM

## 2024-10-01 ENCOUNTER — OFFICE VISIT (OUTPATIENT)
Facility: CLINIC | Age: 59
End: 2024-10-01
Payer: COMMERCIAL

## 2024-10-01 VITALS
BODY MASS INDEX: 27.43 KG/M2 | OXYGEN SATURATION: 97 % | HEART RATE: 85 BPM | HEIGHT: 68 IN | WEIGHT: 181 LBS | SYSTOLIC BLOOD PRESSURE: 116 MMHG | DIASTOLIC BLOOD PRESSURE: 78 MMHG

## 2024-10-01 DIAGNOSIS — R04.0 EPISTAXIS: Primary | ICD-10-CM

## 2024-10-01 RX ORDER — OXYMETAZOLINE HCL 0.05 %
2 SPRAY, NON-AEROSOL (ML) NASAL EVERY 12 HOURS PRN
Qty: 30 ML | Refills: 0 | Status: SHIPPED | OUTPATIENT
Start: 2024-10-01 | End: 2024-10-01

## 2024-10-01 RX ORDER — OXYMETAZOLINE HCL 0.05 %
2 SPRAY, NON-AEROSOL (ML) NASAL EVERY 12 HOURS PRN
Qty: 30 ML | Refills: 0 | Status: SHIPPED | OUTPATIENT
Start: 2024-10-01 | End: 2024-10-03

## 2024-10-01 ASSESSMENT — PAIN SCALES - GENERAL: PAINLEVEL: 0-NO PAIN

## 2024-10-01 NOTE — PROGRESS NOTES
"Subjective   Patient ID: Tobi Dominguez is a 59 y.o. male who presents for Epistaxis (Nose Bleed) (On a cruise Sept 24 which is when it started.).    Epistaxis episodes x 3 on a cruise recently and again earlier today.  No history of hypertension.   not using any aspirin/NSAIDs    HPI     Review of Systems    Constitutional: Negative for activity change, appetite change, chills, fatigue and fever.   HENT: Negative for congestion, ear pain, rhinorrhea, sinus pain and sore throat. Nose bleed+  Eyes: Negative for pain, discharge and redness.   Respiratory: Negative for cough, shortness of breath and wheezing.   Cardiovascular: Negative for chest pain.   Gastrointestinal: Negative for abdominal pain.   Skin: Negative for rash.       Objective   Pulse 85   Ht 1.727 m (5' 8\")   Wt 82.1 kg (181 lb)   SpO2 97%   BMI 27.52 kg/m²     Physical Exam    WDWN NAD    No JVP elevation. No palpable Lymph Nodes. No Thyromegaly    HEENT- No active nasal bleeding but evidence of recent nose bleed -L>R    CVS-NL S1/S2 . No MRG    Lungs-CTA. B/S= B/L    Abdomen-Soft, Non-tender. No masses or HSM    Extremities: No C/C/E    Skin-No abnormal moles/rash        Assessment/Plan     Epistaxis-no evidence of underlying lab disorders, including normal CBC, BUN/creatinine and LFTs.  Blood pressure is normal.  No use of aspirin/NSAIDs    Plan:    Avoid NSAIDs/aspirin    Trial of oxymetazoline nasal spray twice daily/as needed    ENT consult for further evaluation of epistaxis.    Call/My chart/ follow up if symptoms persist/worsen         "

## 2024-10-02 ENCOUNTER — HOSPITAL ENCOUNTER (OUTPATIENT)
Dept: RADIOLOGY | Facility: HOSPITAL | Age: 59
Discharge: HOME | End: 2024-10-02
Payer: COMMERCIAL

## 2024-10-02 ENCOUNTER — OFFICE VISIT (OUTPATIENT)
Dept: OTOLARYNGOLOGY | Facility: CLINIC | Age: 59
End: 2024-10-02
Payer: COMMERCIAL

## 2024-10-02 VITALS — WEIGHT: 178 LBS | BODY MASS INDEX: 27.06 KG/M2

## 2024-10-02 DIAGNOSIS — N40.1 BENIGN PROSTATIC HYPERPLASIA WITH URINARY RETENTION: ICD-10-CM

## 2024-10-02 DIAGNOSIS — R04.0 EPISTAXIS, RECURRENT: ICD-10-CM

## 2024-10-02 DIAGNOSIS — R33.8 BENIGN PROSTATIC HYPERPLASIA WITH URINARY RETENTION: ICD-10-CM

## 2024-10-02 DIAGNOSIS — H74.8X9: ICD-10-CM

## 2024-10-02 PROCEDURE — 1036F TOBACCO NON-USER: CPT | Performed by: GENERAL PRACTICE

## 2024-10-02 PROCEDURE — 76770 US EXAM ABDO BACK WALL COMP: CPT | Performed by: STUDENT IN AN ORGANIZED HEALTH CARE EDUCATION/TRAINING PROGRAM

## 2024-10-02 PROCEDURE — 76770 US EXAM ABDO BACK WALL COMP: CPT

## 2024-10-02 PROCEDURE — 99204 OFFICE O/P NEW MOD 45 MIN: CPT | Performed by: GENERAL PRACTICE

## 2024-10-02 RX ORDER — GUAIFENESIN, PSEUDOEPHEDRINE HYDROCHLORIDE 600; 60 MG/1; MG/1
1 TABLET, EXTENDED RELEASE ORAL EVERY 12 HOURS
Qty: 60 TABLET | Refills: 2 | Status: SHIPPED | OUTPATIENT
Start: 2024-10-02 | End: 2025-10-02

## 2024-10-02 RX ORDER — MUPIROCIN 20 MG/G
OINTMENT TOPICAL 2 TIMES DAILY
Qty: 22 G | Refills: 2 | Status: SHIPPED | OUTPATIENT
Start: 2024-10-02 | End: 2024-10-16

## 2024-10-02 RX ORDER — AMOXICILLIN AND CLAVULANATE POTASSIUM 875; 125 MG/1; MG/1
1 TABLET, FILM COATED ORAL 2 TIMES DAILY
Qty: 14 TABLET | Refills: 0 | Status: SHIPPED | OUTPATIENT
Start: 2024-10-02 | End: 2024-10-09

## 2024-10-02 NOTE — PROGRESS NOTES
Otolaryngology - Head and Neck Surgery Outpatient New Patient Visit Note        Assessment/Plan:   Problem List Items Addressed This Visit    None  Visit Diagnoses         Codes    Epistaxis, recurrent     R04.0    Relevant Medications    mupirocin (Bactroban) 2 % ointment    Hematotympanum, unspecified laterality     H74.8X9    Relevant Medications    amoxicillin-pot clavulanate (Augmentin) 875-125 mg tablet    pseudoephedrine-guaifenesin (Mucinex D)  mg 12 hr tablet            59yoM with recent recurrent L>R epistaxis.   blood and some mucosal inflammation on the left without active bleeding.  No obvious source for cautery.  Discussed conservative management vs debridement and posssible cautery and pt elects for conservative management for now.     L>R hemotympanum which may be due to recetn epistaxis, but pt reports recent flights with some ETD symptoms.   Will treat with amoxicillin to prevent possible otitis development.  mucinexD to aid in clearance of debris.        NOSE CARE and NOSEBLEED PREVENTION  - Do not stick anything in your nose other than the medicine as noted below. DO NOT pick your nose as this will cause the bleeding  - Avoid any nose blowing, sneeze with your mouth open, avoid any maneuvers that increase the blood pressure in your head (such as straining on the toilet) and keep your head above your heart as much as possible until otherwise directed.  - Begin Mupirocin ointment 3 times daily FOR 2-4 WEEKS as directed. Use the pads of your fingers to apply the ointment and then sniff back gently. Do not use a cue tip or finger nail to place the ointment as this can cause further trauma. IF THE PRESCRIBED OINTMENT IS TOO EXPENSIVE THEN JUST USE OVER THE COUNTER BACITRACIN OR TRIPLE ANTIBIOTIC OINTMENT.  - We recommended the patient use a humidifier in the bedroom and in the house.  - After 2 weeks start using nasal saline gel (Ayr nasal gel) at least 3 times per day. Alternatively, you can  also use Vaseline three times daily. You can also use a saline nasal spray (Ocean nasal spray) 4-6 times daily. These are all over the counter medications  - We discussed nosebleed prevention and acute treatment - Afrin or oxymetazoline spray, 2 sprays in each nostril for bleeding, apply pressure for 10 minutes across the soft part of the nose (pinch your nostrils together). If bleeding occurs reapply for another 10 minutes. If this doesn't work then call our office or go to the Emergency Department.         Follow up:  -plan for follow up in clinic as needed and in 2-4 weeks    All of the above findings, impressions, treatment planning and follow up plans were discussed with the patient who indicated understanding.  the patient was instructed to contact or return to clinic sooner if symptoms/signs persist or worsen despite the above management.      Dallas Brown MD  Otolaryngology - Head and Neck Surgery            History Of Present Illness  Tobi Dominguez is a 59 y.o. male presenting for concerns for recent epistaxis.    Pt was on cruise and started to get recurrent epistaxis L>R.   No inciting illness/trauma  No significant prior history  No significant rhinitis or sinusitis   No prior nasal trauma/surgery    Pt was treated with TXA and nasal packing during the cruise.   Symptoms have continued on/off since return.   Epistaxis up to multiple times per day, L>R.   Controlled with pressure, packing.     Noted some ear pressure/fullness and sinus pressure with flights recently.                  Past Medical History  He has a past medical history of Callus of foot (03/21/2024), Cough (10/27/2023), Infective pharyngitis (11/14/2023), Low serum testosterone level (10/12/2012), Personal history of other specified conditions (01/15/2014), Presbyopia (12/17/2014), Shoulder pain (03/21/2024), and Tear of rotator cuff (03/21/2024).    Surgical History  He has a past surgical history that includes Sinus surgery  (01/15/2014).     Social History  He reports that he quit smoking about 22 years ago. His smoking use included cigarettes. He has never used smokeless tobacco. He reports current alcohol use. He reports current drug use. Drug: Marijuana.    Family History  Family History   Problem Relation Name Age of Onset    Lung cancer Mother      Stomach cancer Maternal Grandfather      Heart attack Paternal Grandfather      Stroke Paternal Grandfather          Allergies  Patient has no known allergies.    Review of Systems  ROS: Pertinent positives as noted in HPI.    - CONSTITUTIONAL: Does not report weight loss, fever or chills.    - HEENT:   Ear: Does not report tinnitus, vertigo,    ,  , otorrhea  Nose: Does not report  ,  ,  , decreased smell  Throat: Does not report pain, dysphagia, odynophagia  Larynx: Does not report hoarseness,  difficulty breathing, pain with speaking (odynophonia)  Neck: Does not report new masses, pain, swelling  Face: Does not report sinus pain, pressure, swelling, numbness, weakness     - RESPIRATORY: Does not report SOB or cough.    - CV: Does not report palpitations or chest pain.     - GI: Does not report abdominal pain, nausea, vomiting or diarrhea.    - : Does not report dysuria or urinary frequency.    - MSK: Does not report myalgia or joint pain.    - SKIN: Does not report rash or pruritus.    - NEUROLOGICAL: Does not report headache or syncope.    - PSYCHIATRIC: Does not report recent changes in mood. Does not report anxiety or depression.         Physical Exam:     GENERAL:   Alert & Oriented to person, place and time; Normal affect and appearance. Well developed and well nourished. Conversant & cooperative with examination.     HEAD:   Normocephalic, atraumatic. No sinus tenderness to palpation. Normal parotid bilaterally. Normal facial strength.     NEUROLOGIC:   Cranial nerves II-XII grossly intact, gait WNL. Normal mood and affect.    EYES:   Extraocular movements intact. Pupils  equal, round, reactive to light and accommodation. No nystagmus, no ptosis. no scleral injection.    EAR:   Normal auricle. No discomfort or TTP with manipulation.   Handheld otoscopic exam showed normal external auditory canals bilaterally. No purulence or EAC inflammation. Minimal cerumen.   Right tympanic membrane clear  without with hemotympanum but withoutevidence of perforation, retraction    Left tympanic membrane clear with partial hemotympanum  without evidence of perforation, retraction or      NOSE:   No external deformity. No external nasal lesions, lacerations, or scars. Nasal tip symmetrical with normal nasal valves.   Nasal cavity with essentially midline septum, edematous/erythematous  mucosa and turbinates. Left low nasal cavity with moist blood/clot without active bleeding.  No lesions, masses, purulence or polyps.     OC/OP:   Mucous membranes moist, no masses, lesions or exudates.   Normal tongue, floor of mouth, teeth, gums, lips. Normal posterior pharyngeal wall.    Normal tonsils without erythema, exudate or obvious calculi     NECK:   No neck masses or thyroid enlargement. Trachea midline. No tenderness to palpation    LYMPHATIC:   No cervical lymphadenopathy.     RESPIRATORY:   Symmetric chest elevation & no retractions. No significant hoarseness. No increased work of breathing.    CV:   No clubbing or cyanosis. No obvious edema    Skin:   No facial rashes, vesicles or lesions.     Extremities:   No gross abnormalities      Clinic Procedure        Information review:  External sources (notes, imaging, lab results) listed below personally reviewed to aid in medical decision making process.  - PCM note Josy 10/1/24  - PCM note Titus 11/14/23, 10/27/23  -

## 2024-10-08 DIAGNOSIS — N52.9 ERECTILE DYSFUNCTION, UNSPECIFIED ERECTILE DYSFUNCTION TYPE: ICD-10-CM

## 2024-10-08 DIAGNOSIS — E29.1 HYPOGONADISM MALE: ICD-10-CM

## 2024-10-08 RX ORDER — TESTOSTERONE CYPIONATE 200 MG/ML
200 INJECTION, SOLUTION INTRAMUSCULAR
Qty: 10 ML | Refills: 2 | Status: SHIPPED | OUTPATIENT
Start: 2024-10-08

## 2024-10-08 RX ORDER — TADALAFIL 20 MG/1
20 TABLET ORAL
Qty: 30 TABLET | Refills: 1 | Status: SHIPPED | OUTPATIENT
Start: 2024-10-08 | End: 2025-10-08

## 2024-10-09 NOTE — PROGRESS NOTES
HPI    59 y.o. male being seen with the following problem list:    Problem list:  Urinary retention, asymptoamtic  On T 200 IM     Referred by Dr. Alamo      09/12/24 - PVR 686cc. He voices concerns with hesitancy, and retention, reports occasional accidents while he's getting ready to leave the house. Overall not too bothered with his urination. No urinary medications.     10/2/24 Renal US  -Findings consistent with urinary retention with similar bladder  volume seen on postvoid imaging in comparison to pre void imaging.  -Prostatomegaly, correlation with PSA is recommended.  -Left renal cyst.    10/10/24 - Presents today for cysto. Voiding better on Flomax. PVR after cysto still 690cc        Lab Results   Component Value Date    PSA 1.16 09/27/2023    PSA 0.80 10/05/2020    PSA 1.01 07/02/2018         Current Medications:  Current Outpatient Medications   Medication Sig Dispense Refill    buPROPion XL (Wellbutrin XL) 300 mg 24 hr tablet Take 1 tablet (300 mg) by mouth once daily. Do not crush, chew, or split.      fluticasone (Flonase) 50 mcg/actuation nasal spray Administer 1 spray into affected nostril(s) 2 times a day.      linaCLOtide (Linzess) 290 mcg capsule Take 1 capsule (290 mcg) by mouth once daily in the morning. Take before meals. Do not crush or chew. 90 capsule 1    mupirocin (Bactroban) 2 % ointment Apply topically 2 times a day for 14 days. 22 g 2    omeprazole (PriLOSEC) 20 mg DR capsule Take 1 capsule (20 mg) by mouth 2 times a day. Do not crush or chew.      ondansetron ODT (Zofran-ODT) 8 mg disintegrating tablet       oxymetazoline 0.05 % nasal spray Administer 2 sprays into each nostril every 12 hours if needed for congestion for up to 2 days. Do not use for more than 3 days. 30 mL 0    pseudoephedrine-guaifenesin (Mucinex D)  mg 12 hr tablet Take 1 tablet by mouth every 12 hours. Do not crush, chew, or split. 60 tablet 2    tadalafil 20 mg tablet Take 1 tablet (20 mg) by mouth every  3rd day if needed for erectile dysfunction. 30 tablet 1    tamsulosin (Flomax) 0.4 mg 24 hr capsule Take 1 capsule (0.4 mg) by mouth once daily. (Patient not taking: Reported on 10/1/2024) 30 capsule 11    testosterone cypionate (Depo-Testosterone) 200 mg/mL injection Inject 1 mL (200 mg) into the muscle 1 (one) time per week. 10 mL 2    traZODone (Desyrel) 50 mg tablet Take 2 tablets (100 mg) by mouth as needed at bedtime for sleep. 180 tablet 1    vilazodone (Viibryd) 40 mg tablet Take 1.5 tablets (60 mg) by mouth once daily with a meal.       No current facility-administered medications for this visit.        Active Problems:  Tobi Dominguez is a 59 y.o. male with the following Problems and Medications.  Patient Active Problem List   Diagnosis    Asymmetric sensorineural deafness    Benign essential hypertension    Chronic renal impairment, stage 1    Depression with anxiety    Dysfunction of right rotator cuff    Herpes zoster    Hypercholesterolemia    Hypogonadism male    Insomnia    Malaise and fatigue    Nonalcoholic fatty liver disease    Organic impotence    Rotator cuff tear, right    Sensorineural hearing loss (SNHL) of right ear with unrestricted hearing of left ear    Shingles (herpes zoster) polyneuropathy    Chronic constipation    Gout    IBS (irritable bowel syndrome)    Acute non-recurrent maxillary sinusitis    Cough due to bronchospasm    Postnasal drip    Annual physical exam    Acute bronchitis    Acute otitis externa    Urinary hesitancy    Benign prostatic hyperplasia with urinary retention     Current Outpatient Medications   Medication Sig Dispense Refill    buPROPion XL (Wellbutrin XL) 300 mg 24 hr tablet Take 1 tablet (300 mg) by mouth once daily. Do not crush, chew, or split.      fluticasone (Flonase) 50 mcg/actuation nasal spray Administer 1 spray into affected nostril(s) 2 times a day.      linaCLOtide (Linzess) 290 mcg capsule Take 1 capsule (290 mcg) by mouth once daily in the  morning. Take before meals. Do not crush or chew. 90 capsule 1    mupirocin (Bactroban) 2 % ointment Apply topically 2 times a day for 14 days. 22 g 2    omeprazole (PriLOSEC) 20 mg DR capsule Take 1 capsule (20 mg) by mouth 2 times a day. Do not crush or chew.      ondansetron ODT (Zofran-ODT) 8 mg disintegrating tablet       oxymetazoline 0.05 % nasal spray Administer 2 sprays into each nostril every 12 hours if needed for congestion for up to 2 days. Do not use for more than 3 days. 30 mL 0    pseudoephedrine-guaifenesin (Mucinex D)  mg 12 hr tablet Take 1 tablet by mouth every 12 hours. Do not crush, chew, or split. 60 tablet 2    tadalafil 20 mg tablet Take 1 tablet (20 mg) by mouth every 3rd day if needed for erectile dysfunction. 30 tablet 1    tamsulosin (Flomax) 0.4 mg 24 hr capsule Take 1 capsule (0.4 mg) by mouth once daily. (Patient not taking: Reported on 10/1/2024) 30 capsule 11    testosterone cypionate (Depo-Testosterone) 200 mg/mL injection Inject 1 mL (200 mg) into the muscle 1 (one) time per week. 10 mL 2    traZODone (Desyrel) 50 mg tablet Take 2 tablets (100 mg) by mouth as needed at bedtime for sleep. 180 tablet 1    vilazodone (Viibryd) 40 mg tablet Take 1.5 tablets (60 mg) by mouth once daily with a meal.       No current facility-administered medications for this visit.       PMH:  Past Medical History:   Diagnosis Date    Callus of foot 03/21/2024    Cough 10/27/2023    Infective pharyngitis 11/14/2023    Low serum testosterone level 10/12/2012    Personal history of other specified conditions 01/15/2014    History of insomnia    Presbyopia 12/17/2014    Shoulder pain 03/21/2024    Tear of rotator cuff 03/21/2024       PSH:  Past Surgical History:   Procedure Laterality Date    SINUS SURGERY  01/15/2014    Sinus Surgery       FMH:  Family History   Problem Relation Name Age of Onset    Lung cancer Mother      Stomach cancer Maternal Grandfather      Heart attack Paternal Grandfather       Stroke Paternal Grandfather         SHx:  Social History     Tobacco Use    Smoking status: Former     Current packs/day: 0.00     Types: Cigarettes     Quit date:      Years since quittin.7    Smokeless tobacco: Never   Substance Use Topics    Alcohol use: Yes     Comment: social    Drug use: Yes     Types: Marijuana       Allergies:  No Known Allergies    Procedure:  After informed consent was obtained, the patient was taken to the procedure room for cystoscopy due to urinary retention.     Cystoscopy     Procedure Note:    A sterile prep and drape was performed in standard fashion. Lidocaine was used for topical anesthesia. A flexible cystoscope was inserted into the urethra without difficulty revealing normal urethra.     The prostate was small, elevated bladder neck     Then entered the bladder revealing bladder mucosa with no erythematous patches or plaques, foreign bodies, stones or papillary lesions. Bladder was heavily trabeculated, large anterior diverticulum near the dome, very large capacity overall. No masses were seen on retroflexion.     Post-Procedure:   The cystoscope was removed. The vital signs were stable . The patient tolerated the procedure well. There were no complications.         Assessment/Plan  59 year old male with urinary retention, asymptomatic. Cysto today reveals a small but obstructive prostate and a significantly deteriorated bladder. PVR after cysto still nearly 700cc.    We discussed surgical options for his prostate and bothersome LUTS. We discussed various options including TURP, greenlight, Rezum, Urolift, HoLEP. We discussed HoLEP as a size-independent procedure that maximally de-obstructs the prostate with relatively less postop healing and recovery as compared to other modalities. We discussed the surgery in detail. Discussed the risks of bleeding, infection, scarring, transient incontinence, rare (<1%) risk of long-term incontinence. Discussed the perioperative  pathway. Discussed the near certainty of permanent ejaculatory dysfunction, but likely no change to his baseline erectile function.     We may not be able to get him to emptying completely but the goal is to maximally de-obstruct him to allow him to empty better and preserve his kidney functions. He understands this and would like to proceed with surgery. Will schedule him accordingly.       Scribe Attestation  By signing my name below, I, Parris Woods, attest that this documentation  has been prepared under the direction and in the presence of Juni Lopez MD.

## 2024-10-10 ENCOUNTER — PREP FOR PROCEDURE (OUTPATIENT)
Dept: UROLOGY | Facility: HOSPITAL | Age: 59
End: 2024-10-10

## 2024-10-10 ENCOUNTER — PROCEDURE VISIT (OUTPATIENT)
Dept: UROLOGY | Facility: HOSPITAL | Age: 59
End: 2024-10-10
Payer: COMMERCIAL

## 2024-10-10 VITALS — HEART RATE: 67 BPM | SYSTOLIC BLOOD PRESSURE: 143 MMHG | DIASTOLIC BLOOD PRESSURE: 89 MMHG

## 2024-10-10 DIAGNOSIS — N40.1 ENLARGED PROSTATE WITH URINARY OBSTRUCTION: Primary | ICD-10-CM

## 2024-10-10 DIAGNOSIS — R33.8 BENIGN PROSTATIC HYPERPLASIA WITH URINARY RETENTION: Primary | ICD-10-CM

## 2024-10-10 DIAGNOSIS — N13.8 ENLARGED PROSTATE WITH URINARY OBSTRUCTION: Primary | ICD-10-CM

## 2024-10-10 DIAGNOSIS — N40.1 BENIGN PROSTATIC HYPERPLASIA WITH URINARY RETENTION: Primary | ICD-10-CM

## 2024-10-10 LAB
POC APPEARANCE, URINE: CLEAR
POC BILIRUBIN, URINE: NEGATIVE
POC BLOOD, URINE: NEGATIVE
POC COLOR, URINE: YELLOW
POC GLUCOSE, URINE: NEGATIVE MG/DL
POC KETONES, URINE: NEGATIVE MG/DL
POC LEUKOCYTES, URINE: NEGATIVE
POC NITRITE,URINE: NEGATIVE
POC PH, URINE: 6.5 PH
POC PROTEIN, URINE: NEGATIVE MG/DL
POC SPECIFIC GRAVITY, URINE: 1.02
POC UROBILINOGEN, URINE: 0.2 EU/DL

## 2024-10-10 PROCEDURE — 52000 CYSTOURETHROSCOPY: CPT | Performed by: UROLOGY

## 2024-10-10 PROCEDURE — 99214 OFFICE O/P EST MOD 30 MIN: CPT | Performed by: UROLOGY

## 2024-10-10 PROCEDURE — G2211 COMPLEX E/M VISIT ADD ON: HCPCS | Performed by: UROLOGY

## 2024-10-10 PROCEDURE — 81003 URINALYSIS AUTO W/O SCOPE: CPT | Performed by: UROLOGY

## 2024-10-10 RX ORDER — CEFAZOLIN SODIUM 2 G/100ML
2 INJECTION, SOLUTION INTRAVENOUS ONCE
OUTPATIENT
Start: 2024-10-10 | End: 2024-10-10

## 2024-10-16 ENCOUNTER — CLINICAL SUPPORT (OUTPATIENT)
Dept: PREADMISSION TESTING | Facility: HOSPITAL | Age: 59
End: 2024-10-16
Payer: COMMERCIAL

## 2024-10-16 ENCOUNTER — TELEPHONE (OUTPATIENT)
Dept: PREADMISSION TESTING | Facility: HOSPITAL | Age: 59
End: 2024-10-16
Payer: COMMERCIAL

## 2024-10-16 DIAGNOSIS — N40.1 ENLARGED PROSTATE WITH URINARY OBSTRUCTION: ICD-10-CM

## 2024-10-16 DIAGNOSIS — N13.8 ENLARGED PROSTATE WITH URINARY OBSTRUCTION: ICD-10-CM

## 2024-10-16 RX ORDER — TIRZEPATIDE 5 MG/.5ML
5 INJECTION, SOLUTION SUBCUTANEOUS
COMMUNITY

## 2024-10-17 ENCOUNTER — DOCUMENTATION (OUTPATIENT)
Dept: PREADMISSION TESTING | Facility: HOSPITAL | Age: 59
End: 2024-10-17

## 2024-10-17 ENCOUNTER — PRE-ADMISSION TESTING (OUTPATIENT)
Dept: PREADMISSION TESTING | Facility: HOSPITAL | Age: 59
End: 2024-10-17
Payer: COMMERCIAL

## 2024-10-17 ENCOUNTER — LAB (OUTPATIENT)
Dept: LAB | Facility: LAB | Age: 59
End: 2024-10-17

## 2024-10-17 VITALS
RESPIRATION RATE: 16 BRPM | TEMPERATURE: 97.9 F | WEIGHT: 181.66 LBS | HEIGHT: 68 IN | DIASTOLIC BLOOD PRESSURE: 81 MMHG | BODY MASS INDEX: 27.53 KG/M2 | HEART RATE: 91 BPM | SYSTOLIC BLOOD PRESSURE: 117 MMHG | OXYGEN SATURATION: 96 %

## 2024-10-17 DIAGNOSIS — N40.1 ENLARGED PROSTATE WITH URINARY OBSTRUCTION: ICD-10-CM

## 2024-10-17 DIAGNOSIS — N13.8 ENLARGED PROSTATE WITH URINARY OBSTRUCTION: ICD-10-CM

## 2024-10-17 LAB
ANION GAP SERPL CALC-SCNC: 13 MMOL/L (ref 10–20)
APPEARANCE UR: CLEAR
BILIRUB UR STRIP.AUTO-MCNC: NEGATIVE MG/DL
BUN SERPL-MCNC: 15 MG/DL (ref 6–23)
CALCIUM SERPL-MCNC: 9.5 MG/DL (ref 8.6–10.3)
CHLORIDE SERPL-SCNC: 102 MMOL/L (ref 98–107)
CO2 SERPL-SCNC: 27 MMOL/L (ref 21–32)
COLOR UR: NORMAL
CREAT SERPL-MCNC: 1.17 MG/DL (ref 0.5–1.3)
EGFRCR SERPLBLD CKD-EPI 2021: 71 ML/MIN/1.73M*2
ERYTHROCYTE [DISTWIDTH] IN BLOOD BY AUTOMATED COUNT: 13.4 % (ref 11.5–14.5)
GLUCOSE SERPL-MCNC: 80 MG/DL (ref 74–99)
GLUCOSE UR STRIP.AUTO-MCNC: NORMAL MG/DL
HCT VFR BLD AUTO: 37.9 % (ref 41–52)
HGB BLD-MCNC: 13.4 G/DL (ref 13.5–17.5)
KETONES UR STRIP.AUTO-MCNC: NEGATIVE MG/DL
LEUKOCYTE ESTERASE UR QL STRIP.AUTO: NEGATIVE
MCH RBC QN AUTO: 31.8 PG (ref 26–34)
MCHC RBC AUTO-ENTMCNC: 35.4 G/DL (ref 32–36)
MCV RBC AUTO: 90 FL (ref 80–100)
NITRITE UR QL STRIP.AUTO: NEGATIVE
NRBC BLD-RTO: 0 /100 WBCS (ref 0–0)
PH UR STRIP.AUTO: 5.5 [PH]
PLATELET # BLD AUTO: 200 X10*3/UL (ref 150–450)
POTASSIUM SERPL-SCNC: 4 MMOL/L (ref 3.5–5.3)
PROT UR STRIP.AUTO-MCNC: NEGATIVE MG/DL
RBC # BLD AUTO: 4.21 X10*6/UL (ref 4.5–5.9)
RBC # UR STRIP.AUTO: NEGATIVE /UL
SODIUM SERPL-SCNC: 138 MMOL/L (ref 136–145)
SP GR UR STRIP.AUTO: 1.01
UROBILINOGEN UR STRIP.AUTO-MCNC: NORMAL MG/DL
WBC # BLD AUTO: 4.2 X10*3/UL (ref 4.4–11.3)

## 2024-10-17 PROCEDURE — 81003 URINALYSIS AUTO W/O SCOPE: CPT

## 2024-10-17 PROCEDURE — 36415 COLL VENOUS BLD VENIPUNCTURE: CPT

## 2024-10-17 PROCEDURE — 80048 BASIC METABOLIC PNL TOTAL CA: CPT

## 2024-10-17 PROCEDURE — 85027 COMPLETE CBC AUTOMATED: CPT

## 2024-10-17 ASSESSMENT — ENCOUNTER SYMPTOMS
CONSTITUTIONAL NEGATIVE: 1
NECK NEGATIVE: 1
RESPIRATORY NEGATIVE: 1
ENDOCRINE NEGATIVE: 1
CARDIOVASCULAR NEGATIVE: 1
DIFFICULTY URINATING: 1
NEUROLOGICAL NEGATIVE: 1
MUSCULOSKELETAL NEGATIVE: 1
GASTROINTESTINAL NEGATIVE: 1

## 2024-10-17 NOTE — CPM/PAT NURSE NOTE
CPM/PAT Nurse Note      Name: Tobi Dominguez (Tobi Dominguez)  /Age: 1965/59 y.o.       Past Medical History:   Diagnosis Date    Arthritis     Asymmetric sensorineural deafness     Benign essential hypertension     Chronic kidney disease     Stage 1-    Chronic nasal congestion     daily mucinex, fonase prn.  seen for nose bleeds by ENT 10/2024    Depression with anxiety     Enlarged prostate with urinary obstruction     Plan: Holmium Laser Enucleation Transurethral Prostate 24    GERD (gastroesophageal reflux disease)     Gout     Hyperlipidemia     IBS (irritable bowel syndrome)     Insomnia     Nonalcoholic fatty liver disease     Obesity     on Mounjaro       Past Surgical History:   Procedure Laterality Date    COLONOSCOPY  2011    COLONOSCOPY      CYSTOSCOPY  10/10/2024    SINUS SURGERY  1996    deviated septum    UMBILICAL HERNIA REPAIR  2015       Patient  has no history on file for sexual activity.    Family History   Problem Relation Name Age of Onset    Lung cancer Mother      Other (Other) Father          Covid    No Known Problems Sister      No Known Problems Sister      No Known Problems Brother      Stomach cancer Maternal Grandfather      Heart attack Paternal Grandfather      Stroke Paternal Grandfather         No Known Allergies    Prior to Admission medications    Medication Sig Start Date End Date Taking? Authorizing Provider   buPROPion XL (Wellbutrin XL) 300 mg 24 hr tablet Take 1 tablet (300 mg) by mouth once daily. Do not crush, chew, or split.    Historical Provider, MD   fluticasone (Flonase) 50 mcg/actuation nasal spray Administer 1 spray into affected nostril(s) 2 times a day as needed for rhinitis. 8/10/22   Historical Provider, MD   linaCLOtide (Linzess) 290 mcg capsule Take 1 capsule (290 mcg) by mouth once daily in the morning. Take before meals. Do not crush or chew. 24   Reyes Qureshi MD   mupirocin (Bactroban) 2 % ointment Apply topically 2  times a day for 14 days.  Patient not taking: Reported on 10/17/2024 10/2/24 10/16/24  Dallas Brown MD   omeprazole (PriLOSEC) 20 mg DR capsule Take 1 capsule (20 mg) by mouth once daily. Do not crush or chew.    Historical Provider, MD   ondansetron ODT (Zofran-ODT) 8 mg disintegrating tablet Take 1 tablet (8 mg) by mouth once daily as needed for nausea. 12/1/23   Historical Provider, MD   oxymetazoline 0.05 % nasal spray Administer 2 sprays into each nostril every 12 hours if needed for congestion for up to 2 days. Do not use for more than 3 days. 10/1/24 10/17/24  Monty Ferris MD   pseudoephedrine-guaifenesin (Mucinex D)  mg 12 hr tablet Take 1 tablet by mouth every 12 hours. Do not crush, chew, or split. 10/2/24 10/2/25  Dallas Brown MD   tadalafil 20 mg tablet Take 1 tablet (20 mg) by mouth every 3rd day if needed for erectile dysfunction. 10/8/24 10/8/25  Monty Ferris MD   tamsulosin (Flomax) 0.4 mg 24 hr capsule Take 1 capsule (0.4 mg) by mouth once daily. 9/12/24 9/12/25  Juni Lopez MD   testosterone cypionate (Depo-Testosterone) 200 mg/mL injection Inject 1 mL (200 mg) into the muscle 1 (one) time per week.  Patient taking differently: Inject 1 mL (200 mg) into the muscle 1 (one) time per week. SATURDAY 10/8/24   Monty Ferris MD   tirzepatide (Mounjaro) 5 mg/0.5 mL pen injector Inject 5 mg under the skin every 7 days. MONDAYS    Historical Provider, MD   traZODone (Desyrel) 50 mg tablet Take 2 tablets (100 mg) by mouth as needed at bedtime for sleep. 10/27/23   Reyes Qureshi MD   vilazodone (Viibryd) 40 mg tablet Take 1 tablet (40 mg) by mouth once daily with breakfast. 1/15/14   Historical Provider, MD JEFF TURNER     DASI Risk Score    No data to display       Caprini DVT Assessment    No data to display       Modified Frailty Index    No data to display       CHADS2 Stroke Risk  Current as of today        N/A 3 to 100%: High Risk   2 to < 3%: Medium Risk   0 to < 2%: Low  Risk     Last Change: N/A          This score determines the patient's risk of having a stroke if the patient has atrial fibrillation.        This score is not applicable to this patient. Components are not calculated.          Revised Cardiac Risk Index    No data to display       Apfel Simplified Score    No data to display       Risk Analysis Index Results This Encounter    No data found in the last 10 encounters.         Nurse Plan of Action:       After Visit Summary (AVS) reviewed and patient verbalized good understanding of medications and NPO instructions.

## 2024-10-17 NOTE — PREPROCEDURE INSTRUCTIONS
Medication List            Accurate as of October 17, 2024  2:14 PM. Always use your most recent med list.                buPROPion  mg 24 hr tablet  Commonly known as: Wellbutrin XL  Medication Adjustments for Surgery: Take/Use as prescribed     fluticasone 50 mcg/actuation nasal spray  Commonly known as: Flonase  Medication Adjustments for Surgery: Take/Use as prescribed     linaCLOtide 290 mcg capsule  Commonly known as: Linzess  Take 1 capsule (290 mcg) by mouth once daily in the morning. Take before meals. Do not crush or chew.  Medication Adjustments for Surgery: Do Not take on the morning of surgery     Mounjaro 5 mg/0.5 mL pen injector  Generic drug: tirzepatide  Additional Medication Adjustments for Surgery: Take last dose 7 days before surgery  Notes to patient: LAST DOSE 10/21/24     omeprazole 20 mg DR capsule  Commonly known as: PriLOSEC  Medication Adjustments for Surgery: Take/Use as prescribed     ondansetron ODT 8 mg disintegrating tablet  Commonly known as: Zofran-ODT  Medication Adjustments for Surgery: Take/Use as prescribed     oxymetazoline 0.05 % nasal spray  Commonly known as: Afrin  Administer 2 sprays into each nostril every 12 hours if needed for congestion for up to 2 days. Do not use for more than 3 days.  Medication Adjustments for Surgery: Take/Use as prescribed     pseudoephedrine-guaifenesin  mg 12 hr tablet  Commonly known as: Mucinex D  Take 1 tablet by mouth every 12 hours. Do not crush, chew, or split.  Medication Adjustments for Surgery: Do Not take on the morning of surgery     tadalafil 20 mg tablet  Commonly known as: Cialis  Take 1 tablet (20 mg) by mouth every 3rd day if needed for erectile dysfunction.  Medication Adjustments for Surgery: Take last dose 3 days before surgery     tamsulosin 0.4 mg 24 hr capsule  Commonly known as: Flomax  Take 1 capsule (0.4 mg) by mouth once daily.  Medication Adjustments for Surgery: Take/Use as prescribed     testosterone  cypionate 200 mg/mL injection  Commonly known as: Depo-Testosterone  Inject 1 mL (200 mg) into the muscle 1 (one) time per week.  Medication Adjustments for Surgery: Do Not take on the morning of surgery     traZODone 50 mg tablet  Commonly known as: Desyrel  Take 2 tablets (100 mg) by mouth as needed at bedtime for sleep.  Medication Adjustments for Surgery: Take/Use as prescribed     vilazodone 40 mg tablet  Commonly known as: Viibryd  Medication Adjustments for Surgery: Take/Use as prescribed            CONTACT SURGEON'S OFFICE IF YOU DEVELOP:  * Fever = 100.4 F   * New respiratory symptoms (e.g. cough, shortness of breath, respiratory distress, sore throat)  * Recent loss of taste or smell  *Flu like symptoms such as headache, fatigue or gastrointestinal symptoms  * You develop any open sores, shingles, burning or painful urination   AND/OR:  * You no longer wish to have the surgery.  * Any other personal circumstances change that may lead to the need to cancel or defer this surgery.  *You were admitted to any hospital within one week of your planned procedure.    SMOKING:  *Quitting smoking can make a huge difference to your health and recovery from surgery.    *If you need help with quitting, call 4-304-QUIT-NOW.    THE DAY BEFORE SURGERY:  *Do not eat any food after midnight the night before your surgery.   *You may have up to 13.5 OUNCES of clear liquids until TWO hours before your instructed ARRIVAL TIME to hospital. This includes water, black tea/coffee, (no milk or cream) apple juice, clear broth and electrolyte drinks (Gatorade). Please avoid clear liquids that are red in color.   *You may chew gum/mints up to TWO hours before your surgery/procedure.    SURGICAL TIME:  *You will be contacted between 2 p.m. and 6 p.m. the business day before your surgery with your arrival time.  *If you haven't received a call by 6pm, call [phone].  *Scheduled surgery times may change and you will be notified if this  occurs-check your personal voicemail for any updates.    ON THE MORNING OF SURGERY:  *Wear comfortable, loose fitting clothing.   *Do not use moisturizers, creams, lotions or perfume.  *All jewelry and valuables should be left at home.  *Prosthetic devices such as contact lenses, hearing aids, dentures, eyelash extensions, hairpins and body piercing must be removed before surgery.    BRING WITH YOU:  *Photo ID and insurance card  *Current list of medications and allergies  *Pacemaker/Defibrillator/Heart stent cards  *CPAP machine and mask  *Slings/splints/crutches  *Copy of your complete Advanced Directive/DHPOA-if applicable  *Neurostimulator implant remote    PARKING AND ARRIVAL:  *Check in at the Main Entrance desk and let them know you are here for surgery.  *You will be directed to the 2nd floor surgical waiting area.    IF YOU ARE HAVING OUTPATIENT/SAME DAY SURGERY:  *A responsible adult MUST accompany you at the time of discharge and stay with you for 24 hours after your surgery.  *You may NOT drive yourself home after surgery.  *You may use a taxi or ride sharing service ([email or web address], [employer]) to return home ONLY if you are accompanied by a friend or family member.  *Instructions for resuming your medications will be provided by your surgeon.                                                                     Alert-The patient is alert, awake and responds to voice. The patient is oriented to time, place, and person. The triage nurse is able to obtain subjective information.

## 2024-10-17 NOTE — CPM/PAT H&P
[facility] Evaluation       Name: Tobi Dominguez (Tobi Dominguez)  /Age: [birth year]//59 y.o.     In-Person         Date of Consult: 10/17/24    Referring Provider: Dr. Lopez     Date, Surgery, and Length: 24, holmium laser enucleation transurethral prostate, 120 minutes    Patient presents to [facility] for perioperative risk assessment prior to scheduled surgery. Patient present with urinary retention and hesitancy. Cystoscopy shows small but obstructive prostate and a significantly deteriorated bladder.       This note was created in part upon personal review of patient's medical records.      Pt denies any past history of anesthetic complications such as PONV, awareness, prolonged sedation, dental damage, aspiration, cardiac arrest, difficult intubation, difficult I.V. access or unexpected hospital admissions.  No history of malignant hyperthermia and or pseudocholinesterase deficiency.    No history of blood transfusions.    The patient is not a [employer] and will accept blood and blood products if medically indicated. Type and screen not sent.      Past Medical History:   Diagnosis Date    Arthritis     Asymmetric sensorineural deafness     Benign essential hypertension     Chronic kidney disease     Stage 1-    Chronic nasal congestion     daily mucinex, fonase prn.  seen for nose bleeds by ENT 10/2024    Depression with anxiety     Enlarged prostate with urinary obstruction     Plan: Holmium Laser Enucleation Transurethral Prostate 24    GERD (gastroesophageal reflux disease)     Gout     Hyperlipidemia     IBS (irritable bowel syndrome)     Insomnia     Nonalcoholic fatty liver disease     Obesity     on Mounjaro       Past Surgical History:   Procedure Laterality Date    COLONOSCOPY  2011    COLONOSCOPY  2020    CYSTOSCOPY  10/10/2024    SINUS SURGERY  1996    deviated septum    UMBILICAL HERNIA REPAIR  2015       Family History   Problem Relation Name Age of Onset    Lung cancer  Mother      Other (Other) Father          Covid    No Known Problems Sister      No Known Problems Sister      No Known Problems Brother      Stomach cancer Maternal Grandfather      Heart attack Paternal Grandfather      Stroke Paternal Grandfather       Social History     Tobacco Use   Smoking Status Former    Current packs/day: 0.00    Average packs/day: 0.5 packs/day for 17.0 years (8.5 ttl pk-yrs)    Types: Cigarettes    Start date:     Quit date:     Years since quittin.8   Smokeless Tobacco Never     Social History     Substance and Sexual Activity   Alcohol Use Yes    Alcohol/week: 3.0 standard drinks of alcohol    Types: 3 Shots of liquor per week    Comment: social     Social History     Substance and Sexual Activity   Drug Use Yes    Types: Marijuana       No Known Allergies      Current Outpatient Medications:     buPROPion XL (Wellbutrin XL) 300 mg 24 hr tablet, Take 1 tablet (300 mg) by mouth once daily. Do not crush, chew, or split., Disp: , Rfl:     fluticasone (Flonase) 50 mcg/actuation nasal spray, Administer 1 spray into affected nostril(s) 2 times a day as needed for rhinitis., Disp: , Rfl:     linaCLOtide (Linzess) 290 mcg capsule, Take 1 capsule (290 mcg) by mouth once daily in the morning. Take before meals. Do not crush or chew., Disp: 90 capsule, Rfl: 1    omeprazole (PriLOSEC) 20 mg DR capsule, Take 1 capsule (20 mg) by mouth once daily. Do not crush or chew., Disp: , Rfl:     ondansetron ODT (Zofran-ODT) 8 mg disintegrating tablet, Take 1 tablet (8 mg) by mouth once daily as needed for nausea., Disp: , Rfl:     oxymetazoline 0.05 % nasal spray, Administer 2 sprays into each nostril every 12 hours if needed for congestion for up to 2 days. Do not use for more than 3 days., Disp: 30 mL, Rfl: 0    pseudoephedrine-guaifenesin (Mucinex D)  mg 12 hr tablet, Take 1 tablet by mouth every 12 hours. Do not crush, chew, or split., Disp: 60 tablet, Rfl: 2    tadalafil 20 mg tablet,  "Take 1 tablet (20 mg) by mouth every 3rd day if needed for erectile dysfunction., Disp: 30 tablet, Rfl: 1    tamsulosin (Flomax) 0.4 mg 24 hr capsule, Take 1 capsule (0.4 mg) by mouth once daily., Disp: 30 capsule, Rfl: 11    testosterone cypionate (Depo-Testosterone) 200 mg/mL injection, Inject 1 mL (200 mg) into the muscle 1 (one) time per week. (Patient taking differently: Inject 1 mL (200 mg) into the muscle 1 (one) time per week. SATURDAY), Disp: 10 mL, Rfl: 2    tirzepatide (Mounjaro) 5 mg/0.5 mL pen injector, Inject 5 mg under the skin every 7 days. MONDAYS, Disp: , Rfl:     traZODone (Desyrel) 50 mg tablet, Take 2 tablets (100 mg) by mouth as needed at bedtime for sleep., Disp: 180 tablet, Rfl: 1    vilazodone (Viibryd) 40 mg tablet, Take 1 tablet (40 mg) by mouth once daily with breakfast., Disp: , Rfl:        PAT ROS:   Constitutional:   neg    Neuro/Psych:   neg    Eyes:    use of corrective lenses  Ears:   neg    Nose:   neg    Mouth:   neg    Throat:   neg    Neck:   neg    Cardio:   neg    Respiratory:   neg    Endocrine:   neg    GI:   neg    :    difficulty urinating  Musculoskeletal:   neg    Hematologic:   neg    Skin:  neg        Physical Exam  Vitals reviewed. Physical exam within normal limits.          PAT AIRWAY:   Airway:     Mallampati::  II    Neck ROM::  Full   No broken teeth, no dentures and no missing teeth        Visit Vitals  /81   Pulse 91   Temp 36.6 °C (97.9 °F)   Resp 16   Ht 1.73 m (5' 8.11\")   Wt 82.4 kg (181 lb 10.5 oz)   SpO2 96%   BMI 27.53 kg/m²   Smoking Status Former   BSA 1.99 m²       Assessment and Plan:     Patient is a 59 year old male scheduled for holmium laser enucleation transurethral prostate on 11/1/24 with Dr. Lopez.    Patient is at acceptable risk to proceed with planned surgical procedure. Further cardiac risk stratification deferred at this time.This patient is an intermediate risk candidate undergoing moderate risk procedure, patient is medically " optimized for surgery.    Plan    Neuro:    Depression with anxiety- continue Wellbutrin    Insomnia- continue Trazodone and Vilazodone    Cardiovascular:    RCRI: 0 Risk of Mace: 3.9%    Patient denies any chest pain, tightness, heaviness, pressure, radiating pain, palpitations, irregular heartbeats, lightheadedness, cough, congestion, shortness of breath, BONILLA, PND, near syncope, weight loss or gain.    Good functional capacity  Functional 4 Mets. Patient denies SOB walking up 2 flights of stairs      EKG in PAT not indicated.       Pulm:  Known or suspected CORINNE is considered an independent risk factor for difficult mask ventilation, difficult intubation or both.  Increased vigilance is recommended with the use of narcotics due to an increased risk for opioid induced respiratory depression.  The patient may benefit from continuous pulse oximetry to monitor for hypoxic events until baseline Sp02 is normal on room air.    Stop Bang= 3, male, age >50, obestity    Renal/endo:  Recommendations to avoid nephrotoxic drugs and carefully monitor fluid status to maintain euvolemia. Use dose adjusted medications as needed for the underlying level of renal function.    Obesity- on Mounjaro. Will hold for 7 days prior to surgery.    GI/:    IBS- continue Linzess    Heme:  Patient instructed to ambulate as soon as possible postoperatively to decrease thromboembolic risk.    Initiate mechanical DVT prophylaxis as soon as possible and initiate chemical prophylaxis when deemed safe from a bleeding standpoint post surgery.    Caprini= 4      Risk assessment complete.  Patient is scheduled for an intermediate surgical risk procedure.  He is considered medically optimized for the planned procedure.      Labs/testing obtained in PAT on 10/17/24: CBC, BMP, UA FLEX    Lab Results   Component Value Date    WBC 4.2 (L) 10/17/2024    HGB 13.4 (L) 10/17/2024    HCT 37.9 (L) 10/17/2024    MCV 90 10/17/2024     10/17/2024     Lab  Results   Component Value Date    GLUCOSE 80 10/17/2024    CALCIUM 9.5 10/17/2024     10/17/2024    K 4.0 10/17/2024    CO2 27 10/17/2024     10/17/2024    BUN 15 10/17/2024    CREATININE 1.17 10/17/2024     Follow up: none    Preoperative medication instructions were provided and reviewed with the patient.  Any additional testing or evaluation was explained to the patient.  Nothing by mouth instructions were discussed and patient's questions were answered prior to conclusion to this encounter.  Patient verbalized understanding of preoperative instructions given in preadmission testing; discharge instructions available in EMR.    This note was dictated with speech recognition.  Minor errors may have been detected during use of speech recognition.

## 2024-11-01 ENCOUNTER — ANESTHESIA (OUTPATIENT)
Dept: OPERATING ROOM | Facility: HOSPITAL | Age: 59
End: 2024-11-01
Payer: COMMERCIAL

## 2024-11-01 ENCOUNTER — HOSPITAL ENCOUNTER (OUTPATIENT)
Facility: HOSPITAL | Age: 59
Setting detail: OUTPATIENT SURGERY
Discharge: HOME | End: 2024-11-01
Attending: UROLOGY | Admitting: UROLOGY
Payer: COMMERCIAL

## 2024-11-01 ENCOUNTER — ANESTHESIA EVENT (OUTPATIENT)
Dept: OPERATING ROOM | Facility: HOSPITAL | Age: 59
End: 2024-11-01
Payer: COMMERCIAL

## 2024-11-01 VITALS
WEIGHT: 186.51 LBS | RESPIRATION RATE: 18 BRPM | SYSTOLIC BLOOD PRESSURE: 138 MMHG | OXYGEN SATURATION: 100 % | TEMPERATURE: 97.5 F | DIASTOLIC BLOOD PRESSURE: 83 MMHG | HEIGHT: 68 IN | HEART RATE: 70 BPM | BODY MASS INDEX: 28.27 KG/M2

## 2024-11-01 DIAGNOSIS — N40.1 ENLARGED PROSTATE WITH URINARY OBSTRUCTION: Primary | ICD-10-CM

## 2024-11-01 DIAGNOSIS — N13.8 ENLARGED PROSTATE WITH URINARY OBSTRUCTION: Primary | ICD-10-CM

## 2024-11-01 DIAGNOSIS — E29.1 HYPOGONADISM MALE: ICD-10-CM

## 2024-11-01 PROCEDURE — 3700000002 HC GENERAL ANESTHESIA TIME - EACH INCREMENTAL 1 MINUTE: Performed by: UROLOGY

## 2024-11-01 PROCEDURE — 52649 PROSTATE LASER ENUCLEATION: CPT | Performed by: UROLOGY

## 2024-11-01 PROCEDURE — 7100000010 HC PHASE TWO TIME - EACH INCREMENTAL 1 MINUTE: Performed by: UROLOGY

## 2024-11-01 PROCEDURE — 2500000004 HC RX 250 GENERAL PHARMACY W/ HCPCS (ALT 636 FOR OP/ED): Performed by: ANESTHESIOLOGIST ASSISTANT

## 2024-11-01 PROCEDURE — C1889 IMPLANT/INSERT DEVICE, NOC: HCPCS | Performed by: UROLOGY

## 2024-11-01 PROCEDURE — 7100000002 HC RECOVERY ROOM TIME - EACH INCREMENTAL 1 MINUTE: Performed by: UROLOGY

## 2024-11-01 PROCEDURE — 2500000005 HC RX 250 GENERAL PHARMACY W/O HCPCS: Performed by: UROLOGY

## 2024-11-01 PROCEDURE — 3600000009 HC OR TIME - EACH INCREMENTAL 1 MINUTE - PROCEDURE LEVEL FOUR: Performed by: UROLOGY

## 2024-11-01 PROCEDURE — 3600000004 HC OR TIME - INITIAL BASE CHARGE - PROCEDURE LEVEL FOUR: Performed by: UROLOGY

## 2024-11-01 PROCEDURE — 2500000001 HC RX 250 WO HCPCS SELF ADMINISTERED DRUGS (ALT 637 FOR MEDICARE OP): Performed by: ANESTHESIOLOGY

## 2024-11-01 PROCEDURE — 88307 TISSUE EXAM BY PATHOLOGIST: CPT | Mod: TC,AHULAB | Performed by: UROLOGY

## 2024-11-01 PROCEDURE — 7100000001 HC RECOVERY ROOM TIME - INITIAL BASE CHARGE: Performed by: UROLOGY

## 2024-11-01 PROCEDURE — 2720000007 HC OR 272 NO HCPCS: Performed by: UROLOGY

## 2024-11-01 PROCEDURE — 2500000004 HC RX 250 GENERAL PHARMACY W/ HCPCS (ALT 636 FOR OP/ED): Performed by: ANESTHESIOLOGY

## 2024-11-01 PROCEDURE — 7100000009 HC PHASE TWO TIME - INITIAL BASE CHARGE: Performed by: UROLOGY

## 2024-11-01 PROCEDURE — 3700000001 HC GENERAL ANESTHESIA TIME - INITIAL BASE CHARGE: Performed by: UROLOGY

## 2024-11-01 RX ORDER — OXYCODONE HYDROCHLORIDE 5 MG/1
5 TABLET ORAL EVERY 4 HOURS PRN
Status: DISCONTINUED | OUTPATIENT
Start: 2024-11-01 | End: 2024-11-01 | Stop reason: HOSPADM

## 2024-11-01 RX ORDER — PHENAZOPYRIDINE HYDROCHLORIDE 100 MG/1
100 TABLET, FILM COATED ORAL 3 TIMES DAILY PRN
Qty: 15 TABLET | Refills: 0 | Status: SHIPPED | OUTPATIENT
Start: 2024-11-01

## 2024-11-01 RX ORDER — PROPOFOL 10 MG/ML
INJECTION, EMULSION INTRAVENOUS AS NEEDED
Status: DISCONTINUED | OUTPATIENT
Start: 2024-11-01 | End: 2024-11-01

## 2024-11-01 RX ORDER — OXYCODONE HYDROCHLORIDE 5 MG/1
5 TABLET ORAL EVERY 6 HOURS PRN
Qty: 4 TABLET | Refills: 0 | Status: SHIPPED | OUTPATIENT
Start: 2024-11-01 | End: 2024-11-04

## 2024-11-01 RX ORDER — FENTANYL CITRATE 50 UG/ML
INJECTION, SOLUTION INTRAMUSCULAR; INTRAVENOUS AS NEEDED
Status: DISCONTINUED | OUTPATIENT
Start: 2024-11-01 | End: 2024-11-01

## 2024-11-01 RX ORDER — SODIUM CHLORIDE 0.9 G/100ML
IRRIGANT IRRIGATION AS NEEDED
Status: DISCONTINUED | OUTPATIENT
Start: 2024-11-01 | End: 2024-11-01 | Stop reason: HOSPADM

## 2024-11-01 RX ORDER — SULFAMETHOXAZOLE AND TRIMETHOPRIM 800; 160 MG/1; MG/1
1 TABLET ORAL 2 TIMES DAILY
Qty: 6 TABLET | Refills: 0 | Status: SHIPPED | OUTPATIENT
Start: 2024-11-01 | End: 2024-11-04

## 2024-11-01 RX ORDER — WATER 1 ML/ML
IRRIGANT IRRIGATION AS NEEDED
Status: DISCONTINUED | OUTPATIENT
Start: 2024-11-01 | End: 2024-11-01 | Stop reason: HOSPADM

## 2024-11-01 RX ORDER — ACETAMINOPHEN 325 MG/1
650 TABLET ORAL EVERY 4 HOURS PRN
Status: DISCONTINUED | OUTPATIENT
Start: 2024-11-01 | End: 2024-11-01 | Stop reason: HOSPADM

## 2024-11-01 RX ORDER — CEFAZOLIN 1 G/1
INJECTION, POWDER, FOR SOLUTION INTRAVENOUS AS NEEDED
Status: DISCONTINUED | OUTPATIENT
Start: 2024-11-01 | End: 2024-11-01

## 2024-11-01 RX ORDER — CEFAZOLIN SODIUM 2 G/100ML
2 INJECTION, SOLUTION INTRAVENOUS ONCE
Status: DISCONTINUED | OUTPATIENT
Start: 2024-11-01 | End: 2024-11-01 | Stop reason: HOSPADM

## 2024-11-01 RX ORDER — SODIUM CHLORIDE, SODIUM LACTATE, POTASSIUM CHLORIDE, CALCIUM CHLORIDE 600; 310; 30; 20 MG/100ML; MG/100ML; MG/100ML; MG/100ML
100 INJECTION, SOLUTION INTRAVENOUS CONTINUOUS
Status: DISCONTINUED | OUTPATIENT
Start: 2024-11-01 | End: 2024-11-01 | Stop reason: HOSPADM

## 2024-11-01 RX ORDER — LIDOCAINE HYDROCHLORIDE 10 MG/ML
0.1 INJECTION, SOLUTION EPIDURAL; INFILTRATION; INTRACAUDAL; PERINEURAL ONCE
Status: DISCONTINUED | OUTPATIENT
Start: 2024-11-01 | End: 2024-11-01 | Stop reason: HOSPADM

## 2024-11-01 RX ORDER — DOCUSATE SODIUM 100 MG/1
100 CAPSULE, LIQUID FILLED ORAL 2 TIMES DAILY
Qty: 30 CAPSULE | Refills: 0 | Status: SHIPPED | OUTPATIENT
Start: 2024-11-01

## 2024-11-01 RX ORDER — LIDOCAINE HYDROCHLORIDE 20 MG/ML
INJECTION, SOLUTION INFILTRATION; PERINEURAL AS NEEDED
Status: DISCONTINUED | OUTPATIENT
Start: 2024-11-01 | End: 2024-11-01

## 2024-11-01 RX ORDER — SODIUM CHLORIDE, SODIUM LACTATE, POTASSIUM CHLORIDE, CALCIUM CHLORIDE 600; 310; 30; 20 MG/100ML; MG/100ML; MG/100ML; MG/100ML
INJECTION, SOLUTION INTRAVENOUS CONTINUOUS PRN
Status: DISCONTINUED | OUTPATIENT
Start: 2024-11-01 | End: 2024-11-01

## 2024-11-01 RX ORDER — ONDANSETRON HYDROCHLORIDE 2 MG/ML
INJECTION, SOLUTION INTRAVENOUS AS NEEDED
Status: DISCONTINUED | OUTPATIENT
Start: 2024-11-01 | End: 2024-11-01

## 2024-11-01 RX ORDER — MIDAZOLAM HYDROCHLORIDE 1 MG/ML
INJECTION INTRAMUSCULAR; INTRAVENOUS AS NEEDED
Status: DISCONTINUED | OUTPATIENT
Start: 2024-11-01 | End: 2024-11-01

## 2024-11-01 SDOH — HEALTH STABILITY: MENTAL HEALTH: CURRENT SMOKER: 0

## 2024-11-01 ASSESSMENT — PAIN SCALES - GENERAL
PAINLEVEL_OUTOF10: 5 - MODERATE PAIN
PAINLEVEL_OUTOF10: 5 - MODERATE PAIN
PAINLEVEL_OUTOF10: 7
PAINLEVEL_OUTOF10: 5 - MODERATE PAIN
PAINLEVEL_OUTOF10: 7
PAINLEVEL_OUTOF10: 0 - NO PAIN
PAINLEVEL_OUTOF10: 4

## 2024-11-01 ASSESSMENT — PAIN DESCRIPTION - DESCRIPTORS
DESCRIPTORS: BURNING

## 2024-11-01 ASSESSMENT — PAIN - FUNCTIONAL ASSESSMENT
PAIN_FUNCTIONAL_ASSESSMENT: 0-10

## 2024-11-01 ASSESSMENT — COLUMBIA-SUICIDE SEVERITY RATING SCALE - C-SSRS
1. IN THE PAST MONTH, HAVE YOU WISHED YOU WERE DEAD OR WISHED YOU COULD GO TO SLEEP AND NOT WAKE UP?: NO
6. HAVE YOU EVER DONE ANYTHING, STARTED TO DO ANYTHING, OR PREPARED TO DO ANYTHING TO END YOUR LIFE?: NO
2. HAVE YOU ACTUALLY HAD ANY THOUGHTS OF KILLING YOURSELF?: NO

## 2024-11-01 ASSESSMENT — PAIN DESCRIPTION - LOCATION: LOCATION: PENIS

## 2024-11-03 NOTE — PROGRESS NOTES
[facility]  Outpatient Clinic Note    Patient Name:  Tobi Dominguez  MRN:  [id number]  :  1965  Date of Service: 2024     Visit type: Follow up visit    HPI    Interval History:  Tobi Dominguez is a 59 y.o. male who is being seen today for  problems listed below.     Problem list/Chief complaints:  BPH with urinary retention, asymptomatic - s/p HOLEP with Dr. Lopez 24  On T 200 IM      Referred by Dr. Alamo      24 - PVR 686cc. He voices concerns with hesitancy, and retention, reports occasional accidents while he's getting ready to leave the house. Overall not too bothered with his urination. No urinary medications.      10/2/24 Renal US  -Findings consistent with urinary retention with similar bladder  volume seen on postvoid imaging in comparison to pre void imaging.  -Prostatomegaly, correlation with PSA is recommended.  -Left renal cyst.     10/10/24 - Presents today for cysto. Voiding better on Flomax. PVR after cysto still 690cc    24: S/p HOLEP with Dr. Lopez    24: Patient presents for follow up and TOV. He has been doing well since surgery. Urine is clear and yellow/pink tinged draining into catheter bag. Pain has been managed with oral pain medications. He is eating and drinking with no issues, he has normal bowel movements. He denies any fevers or chills. He is ambulating at baseline. TOV passed, PVR 63 ml.    Past Medical History:   Diagnosis Date    Arthritis     Asymmetric sensorineural deafness     Benign essential hypertension     Chronic kidney disease     Stage 1-    Chronic nasal congestion     daily mucinex, fonase prn.  seen for nose bleeds by ENT 10/2024    Depression with anxiety     Enlarged prostate with urinary obstruction     Plan: Holmium Laser Enucleation Transurethral Prostate 24    GERD (gastroesophageal reflux disease)     Gout     Hyperlipidemia     IBS (irritable bowel syndrome)     Insomnia     Nonalcoholic fatty liver disease      Obesity     on Mounjaro       Past Surgical History:   Procedure Laterality Date    COLONOSCOPY  2011    COLONOSCOPY  2020    CYSTOSCOPY  10/10/2024    SINUS SURGERY  1996    deviated septum    UMBILICAL HERNIA REPAIR  2015       Social History     Socioeconomic History    Marital status:      Spouse name: Not on file    Number of children: Not on file    Years of education: Not on file    Highest education level: Not on file   Occupational History    Not on file   Tobacco Use    Smoking status: Former     Current packs/day: 0.00     Average packs/day: 0.5 packs/day for 17.0 years (8.5 ttl pk-yrs)     Types: Cigarettes     Start date:      Quit date:      Years since quittin.8    Smokeless tobacco: Never   Vaping Use    Vaping status: Some Days    Substances: THC    Devices: Pre-filled or refillable cartridge   Substance and Sexual Activity    Alcohol use: Yes     Alcohol/week: 3.0 standard drinks of alcohol     Types: 3 Shots of liquor per week     Comment: social    Drug use: Yes     Frequency: 2.0 times per week     Types: Marijuana    Sexual activity: Not on file   Other Topics Concern    Not on file   Social History Narrative    Not on file     Social Drivers of Health     Financial Resource Strain: Not on file   Food Insecurity: Not on file   Transportation Needs: Not on file   Physical Activity: Not on file   Stress: Not on file   Social Connections: Not on file   Intimate Partner Violence: Not on file   Housing Stability: Not on file       No Known Allergies       Current Outpatient Medications:     buPROPion XL (Wellbutrin XL) 300 mg 24 hr tablet, Take 1 tablet (300 mg) by mouth once daily. Do not crush, chew, or split., Disp: , Rfl:     docusate sodium (Colace) 100 mg capsule, Take 1 capsule (100 mg) by mouth 2 times a day. Hold for loose stools, Disp: 30 capsule, Rfl: 0    fluticasone (Flonase) 50 mcg/actuation nasal spray, Administer 1 spray into affected nostril(s) 2  times a day as needed for rhinitis., Disp: , Rfl:     linaCLOtide (Linzess) 290 mcg capsule, Take 1 capsule (290 mcg) by mouth once daily in the morning. Take before meals. Do not crush or chew., Disp: 90 capsule, Rfl: 1    omeprazole (PriLOSEC) 20 mg DR capsule, Take 1 capsule (20 mg) by mouth once daily. Do not crush or chew. (Patient not taking: Reported on 11/1/2024), Disp: , Rfl:     ondansetron ODT (Zofran-ODT) 8 mg disintegrating tablet, Take 1 tablet (8 mg) by mouth once daily as needed for nausea. (Patient not taking: Reported on 11/1/2024), Disp: , Rfl:     oxyCODONE (Roxicodone) 5 mg immediate release tablet, Take 1 tablet (5 mg) by mouth every 6 hours if needed for severe pain (7 - 10) for up to 3 days., Disp: 4 tablet, Rfl: 0    oxymetazoline 0.05 % nasal spray, Administer 2 sprays into each nostril every 12 hours if needed for congestion for up to 2 days. Do not use for more than 3 days., Disp: 30 mL, Rfl: 0    phenazopyridine (Pyridium) 100 mg tablet, Take 1 tablet (100 mg) by mouth 3 times a day as needed (burning)., Disp: 15 tablet, Rfl: 0    pseudoephedrine-guaifenesin (Mucinex D)  mg 12 hr tablet, Take 1 tablet by mouth every 12 hours. Do not crush, chew, or split., Disp: 60 tablet, Rfl: 2    sulfamethoxazole-trimethoprim (Bactrim DS) 800-160 mg tablet, Take 1 tablet by mouth 2 times a day for 3 days., Disp: 6 tablet, Rfl: 0    tadalafil 20 mg tablet, Take 1 tablet (20 mg) by mouth every 3rd day if needed for erectile dysfunction., Disp: 30 tablet, Rfl: 1    testosterone cypionate (Depo-Testosterone) 200 mg/mL injection, Inject 1 mL (200 mg) into the muscle 1 (one) time per week. (Patient taking differently: Inject 1 mL (200 mg) into the muscle 1 (one) time per week. SATURDAY), Disp: 10 mL, Rfl: 2    tirzepatide (Mounjaro) 5 mg/0.5 mL pen injector, Inject 5 mg under the skin every 7 days. MONDAYS, Disp: , Rfl:     traZODone (Desyrel) 50 mg tablet, Take 2 tablets (100 mg) by mouth as needed  at bedtime for sleep., Disp: 180 tablet, Rfl: 1    vilazodone (Viibryd) 40 mg tablet, Take 1 tablet (40 mg) by mouth once daily with breakfast., Disp: , Rfl:      Review of system:  All other systems have been reviewed and are negative for complaints      Last recorded vitals:  There were no vitals taken for this visit.    Physical Exam:  General: Appears comfortable and in no apparent distress.  Head: Normocephalic, atraumatic  Eyes: Non-injected conjunctiva, sclera clear, no proptosis  Lungs: Breathing is easy, non-labored. Speaking in clear and complete sentences. Normal diaphragmatic movement.  Cardiovascular: no peripheral edema, cyanosis or pallor.   Abdomen: soft, non-distended, non-tender  : Bladder: non tender, not distended  MSK: Ambulatory with steady gait, unassisted  Skin: No visible rashes or lesions  Neurologic: Alert, oriented to person, place, and time  Psychiatric: mood and affect appropriate      Imaging  US renal complete  Narrative: Interpreted By:  Estevan Bourne,   STUDY:  US RENAL COMPLETE;  10/2/2024 12:21 pm      INDICATION:  Signs/Symptoms:urinary retention.      ,N40.1 Benign prostatic hyperplasia with lower urinary tract  symptoms,R33.8 Other retention of urine      COMPARISON:  None.      ACCESSION NUMBER(S):  [id number]      ORDERING CLINICIAN:  TEO HOLGUIN      TECHNIQUE:  Multiple images of the kidneys were obtained.      FINDINGS:  Right Kidney:  The right kidney measures 11.4 cm in length. The renal cortical  echogenicity is within normal limits. No hydronephrosis. No  nephrolithiasis.      Left Kidney:  The left kidney measures 11.4 cm in length. The renal cortical  echogenicity is within normal limits. No hydronephrosis. No  nephrolithiasis. A cyst is seen in the midpole measuring 1 cm in  length.      Bladder:  The urinary bladder is unremarkable in appearance. Pre void bladder  volume is calculated at 617 mL with postvoid bladder volume  calculated at 612 mL.       Prostate:  The prostate gland is enlarged measuring 4.3 x 4.3 x 3.9 cm.      Impression: Findings consistent with urinary retention with similar bladder  volume seen on postvoid imaging in comparison to pre void imaging.      Prostatomegaly, correlation with PSA is recommended.      Left renal cyst.      MACRO:  None      Signed by: sEtevan Bourne 10/3/2024 8:10 PM  Dictation workstation:   [id number]      Labs  Lab on 10/17/2024   Component Date Value    Glucose 10/17/2024 80     Sodium 10/17/2024 138     Potassium 10/17/2024 4.0     Chloride 10/17/2024 102     Bicarbonate 10/17/2024 27     Anion Gap 10/17/2024 13     Urea Nitrogen 10/17/2024 15     Creatinine 10/17/2024 1.17     eGFR 10/17/2024 71     Calcium 10/17/2024 9.5     WBC 10/17/2024 4.2 (L)     nRBC 10/17/2024 0.0     RBC 10/17/2024 4.21 (L)     Hemoglobin 10/17/2024 13.4 (L)     Hematocrit 10/17/2024 37.9 (L)     MCV 10/17/2024 90     MCH 10/17/2024 31.8     MCHC 10/17/2024 35.4     RDW 10/17/2024 13.4     Platelets 10/17/2024 200     Color, Urine 10/17/2024 Light-Yellow     Appearance, Urine 10/17/2024 Clear     Specific Gravity, Urine 10/17/2024 1.013     pH, Urine 10/17/2024 5.5     Protein, Urine 10/17/2024 NEGATIVE     Glucose, Urine 10/17/2024 Normal     Blood, Urine 10/17/2024 NEGATIVE     Ketones, Urine 10/17/2024 NEGATIVE     Bilirubin, Urine 10/17/2024 NEGATIVE     Urobilinogen, Urine 10/17/2024 Normal     Nitrite, Urine 10/17/2024 NEGATIVE     Leukocyte Esterase, Urine 10/17/2024 NEGATIVE        Assessment and Plan:Louie Dominguez is a 59 y.o. male with history of BPH with urinary obstruction s/p HOLEP with Dr. Lopez on 11/1/24, who presents for POV and TOV.    Plan:  - TOV today passed  - PVR 63 ml  - Patient encouraged to drink plenty of fluids to maintain hydration and clear urine output  - Discussed that they may have some irritative voiding symptoms over the next few days: burning, frequency, urgency  - Activity restrictions  reviewed  - Patient instructed to go to ED if unable to void in 4-6 hr or unable to void with urge  -Follow-up in 2 weeks for PVR, or sooner if needed, to reassess symptoms and for medication refill.    All questions and concerns were addressed. Patient verbalizes understanding and has no other questions at this time.     Some elements copied from Dr. Lopez's note on 11/1/24, the elements have been updated and all reflect current decision making from today, 11/04/24    E&M visit today is associated with current or anticipated ongoing medical care services related to a patient's single, serious condition or a complex condition.    JAY Baez-CNP   [facility]  11/04/24 3:48 PM

## 2024-11-04 ENCOUNTER — APPOINTMENT (OUTPATIENT)
Dept: UROLOGY | Facility: HOSPITAL | Age: 59
End: 2024-11-04
Payer: COMMERCIAL

## 2024-11-04 DIAGNOSIS — N40.1 BENIGN PROSTATIC HYPERPLASIA WITH URINARY RETENTION: Primary | ICD-10-CM

## 2024-11-04 DIAGNOSIS — Z48.89 POSTOPERATIVE VISIT: ICD-10-CM

## 2024-11-04 DIAGNOSIS — R33.8 BENIGN PROSTATIC HYPERPLASIA WITH URINARY RETENTION: Primary | ICD-10-CM

## 2024-11-04 PROCEDURE — 99213 OFFICE O/P EST LOW 20 MIN: CPT | Performed by: NURSE PRACTITIONER

## 2024-11-04 PROCEDURE — G2211 COMPLEX E/M VISIT ADD ON: HCPCS | Performed by: NURSE PRACTITIONER

## 2024-11-04 PROCEDURE — 1036F TOBACCO NON-USER: CPT | Performed by: NURSE PRACTITIONER

## 2024-11-04 PROCEDURE — 51798 US URINE CAPACITY MEASURE: CPT | Performed by: NURSE PRACTITIONER

## 2024-11-04 ASSESSMENT — PAIN SCALES - GENERAL: PAINLEVEL_OUTOF10: 0-NO PAIN

## 2024-11-04 NOTE — PROGRESS NOTES
Pt is here today for a trial of void, name and date of birth was verified. Procedure was explained to pt, and understood. Pt tolerated 180 cc of sterile saline infused through urinary catheter without difficulty. Urinary catheter was removed and patient voided 190 cc. A PVR scan was done and showed 63mL of urine left in the bladder.   It was explained to pt that if unable to urinate to go to the emergency room. Pt was told and understood to drink plenty of fluids to keep the bladder stimulated. If there were any questions or concerns pt understood  he is to call the office.

## 2024-11-06 LAB
LABORATORY COMMENT REPORT: NORMAL
PATH REPORT.FINAL DX SPEC: NORMAL
PATH REPORT.GROSS SPEC: NORMAL
PATH REPORT.RELEVANT HX SPEC: NORMAL
PATH REPORT.TOTAL CANCER: NORMAL

## 2024-11-07 ENCOUNTER — APPOINTMENT (OUTPATIENT)
Dept: OTOLARYNGOLOGY | Facility: CLINIC | Age: 59
End: 2024-11-07
Payer: COMMERCIAL

## 2024-11-13 ENCOUNTER — APPOINTMENT (OUTPATIENT)
Dept: OTOLARYNGOLOGY | Facility: CLINIC | Age: 59
End: 2024-11-13
Payer: COMMERCIAL

## 2024-11-13 VITALS — WEIGHT: 186 LBS | BODY MASS INDEX: 28.28 KG/M2

## 2024-11-13 DIAGNOSIS — Z87.898 HISTORY OF EPISTAXIS: Primary | ICD-10-CM

## 2024-11-13 DIAGNOSIS — H69.93 DYSFUNCTION OF BOTH EUSTACHIAN TUBES: ICD-10-CM

## 2024-11-13 DIAGNOSIS — H90.5 SENSORINEURAL HEARING LOSS (SNHL), UNSPECIFIED LATERALITY: ICD-10-CM

## 2024-11-13 PROCEDURE — 99213 OFFICE O/P EST LOW 20 MIN: CPT | Performed by: GENERAL PRACTICE

## 2024-11-13 PROCEDURE — 1036F TOBACCO NON-USER: CPT | Performed by: GENERAL PRACTICE

## 2024-11-13 NOTE — PROGRESS NOTES
Otolaryngology - Head and Neck Surgery Outpatient New Patient Visit Note        Assessment/Plan:   Problem List Items Addressed This Visit    None  Visit Diagnoses         Codes    History of epistaxis    -  Primary Z87.898    Dysfunction of both eustachian tubes     H69.93    Sensorineural hearing loss (SNHL), unspecified laterality     H90.5    Relevant Orders    Referral to Audiology    Hearing aid evaluation          No recent epistaxis, and ear fullness/pressure resolved.    Pt with known R sided severe SNHL, present since childhood.   Prior limited benefit from hearing aids, would like to consider hearing aids again.    Will refer.         Follow up:  -plan for follow up in clinic as needed    All of the above findings, impressions, treatment planning and follow up plans were discussed with the patient who indicated understanding.  the patient was instructed to contact or return to clinic sooner if symptoms/signs persist or worsen despite the above management.      Dallas Brown MD  Otolaryngology - Head and Neck Surgery            History Of Present Illness  Tobi Dominguez is a 59 y.o. male presenting for follow up for recent epistaxis and left ear fluid.  Reports no further epistaxis since last seen and ear has returned to baseline.     Reports having R sided SNHL since childhood  Previous trial of hearing aids with limited benefit, would like to re-approach.      No otalgia, otorrhea,  No obvious hearing change since most recent audio, 5y ago.        Audio 6/28/18 shows severe/profound SNHL upsloping to moderate on the right.  Hearing WNL on left.  Type A tymps.         Past Medical History  He has a past medical history of Arthritis, Asymmetric sensorineural deafness, Benign essential hypertension, Chronic kidney disease, Chronic nasal congestion, Depression with anxiety, Enlarged prostate with urinary obstruction, GERD (gastroesophageal reflux disease), Gout, Hyperlipidemia, IBS (irritable bowel syndrome),  Insomnia, Nonalcoholic fatty liver disease, and Obesity.    Surgical History  He has a past surgical history that includes Sinus surgery (03/1996); Colonoscopy (08/29/2011); Cystoscopy (10/10/2024); Umbilical hernia repair (08/21/2015); and Colonoscopy (2020).     Social History  He reports that he quit smoking about 22 years ago. His smoking use included cigarettes. He started smoking about 39 years ago. He has a 8.5 pack-year smoking history. He has never used smokeless tobacco. He reports current alcohol use of about 3.0 standard drinks of alcohol per week. He reports current drug use. Frequency: 2.00 times per week. Drug: Marijuana.    Family History  Family History   Problem Relation Name Age of Onset    Lung cancer Mother      Other (Other) Father          Covid    No Known Problems Sister      No Known Problems Sister      No Known Problems Brother      Stomach cancer Maternal Grandfather      Heart attack Paternal Grandfather      Stroke Paternal Grandfather          Allergies  Patient has no known allergies.    Review of Systems  ROS: Pertinent positives as noted in HPI.    - CONSTITUTIONAL: Does not report weight loss, fever or chills.    - HEENT:   Ear: Does not report tinnitus, vertigo,    , otalgia, otorrhea  Nose: Does not report congestion, rhinorrhea, epistaxis, decreased smell  Throat: Does not report pain, dysphagia, odynophagia  Larynx: Does not report hoarseness,  difficulty breathing, pain with speaking (odynophonia)  Neck: Does not report new masses, pain, swelling  Face: Does not report sinus pain, pressure, swelling, numbness, weakness     - RESPIRATORY: Does not report SOB or cough.    - CV: Does not report palpitations or chest pain.     - GI: Does not report abdominal pain, nausea, vomiting or diarrhea.    - : Does not report dysuria or urinary frequency.    - MSK: Does not report myalgia or joint pain.    - SKIN: Does not report rash or pruritus.    - NEUROLOGICAL: Does not report  headache or syncope.    - PSYCHIATRIC: Does not report recent changes in mood. Does not report anxiety or depression.         Physical Exam:     GENERAL:   Alert & Oriented to person, place and time; Normal affect and appearance. Well developed and well nourished. Conversant & cooperative with examination.     HEAD:   Normocephalic, atraumatic. No sinus tenderness to palpation. Normal parotid bilaterally. Normal facial strength.     NEUROLOGIC:   Cranial nerves II-XII grossly intact, gait WNL. Normal mood and affect.    EYES:   Extraocular movements intact. Pupils equal, round, reactive to light and accommodation. No nystagmus, no ptosis. no scleral injection.    EAR:   Normal auricle. No discomfort or TTP with manipulation.   Handheld otoscopic exam showed normal external auditory canals bilaterally. No purulence or EAC inflammation. Minimal cerumen.   Right tympanic membrane clear and mobile without evidence of perforation, retraction or middle ear effusion.   Left tympanic membrane clear and mobile without evidence of perforation, retraction or middle ear effusion.     NOSE:   No external deformity. No external nasal lesions, lacerations, or scars. Nasal tip symmetrical with normal nasal valves.   Nasal cavity with essentially midline septum, normal mucosa and turbinates. No lesions, masses, purulence or polyps.     OC/OP:   Mucous membranes moist, no masses, lesions or exudates.   Normal tongue, floor of mouth, teeth, gums, lips. Normal posterior pharyngeal wall.    Normal tonsils without erythema, exudate or obvious calculi     NECK:   No neck masses or thyroid enlargement. Trachea midline. No tenderness to palpation    LYMPHATIC:   No cervical lymphadenopathy.     RESPIRATORY:   Symmetric chest elevation & no retractions. No significant hoarseness. No increased work of breathing.    CV:   No clubbing or cyanosis. No obvious edema    Skin:   No facial rashes, vesicles or lesions.     Extremities:   No gross  abnormalities      Clinic Procedure        Information review:  External sources (notes, imaging, lab results) listed below personally reviewed to aid in medical decision making process.  -  -  -

## 2024-11-25 ENCOUNTER — CLINICAL SUPPORT (OUTPATIENT)
Dept: AUDIOLOGY | Facility: CLINIC | Age: 59
End: 2024-11-25
Payer: COMMERCIAL

## 2024-11-25 DIAGNOSIS — H90.3 BILATERAL SENSORINEURAL HEARING LOSS: ICD-10-CM

## 2024-11-25 PROCEDURE — 92567 TYMPANOMETRY: CPT

## 2024-11-25 PROCEDURE — 92557 COMPREHENSIVE HEARING TEST: CPT

## 2024-11-25 ASSESSMENT — PAIN - FUNCTIONAL ASSESSMENT: PAIN_FUNCTIONAL_ASSESSMENT: 0-10

## 2024-11-25 ASSESSMENT — PAIN SCALES - GENERAL: PAINLEVEL_OUTOF10: 0 - NO PAIN

## 2024-11-29 NOTE — PROGRESS NOTES
AUDIOLOGIC EVALUATION      Name:  Tobi Dominguez  :  1965  Age:  59 y.o.  Date of Evaluation:  2024    Time: 930-1030    HISTORY:  Tobi Dominguez, 59 y.o., was seen for an audiologic assessment at the request of Dr. Dallas Brown. He has a known history of right sensorineural hearing loss, that has been present since childhood. He has previously purchased a traditional right hearing aid but did not perceive significant benefit and does not wear it anymore. He would like to pursue new devices. He denied otalgia, otorrhea, tinnitus, dizziness, aural pressure/fullness, history of otologic surgeries, family history of hearing loss, and recent falls.       RESULTS:  Otoscopic Evaluation:  Right Ear: Unremarkable  Left Ear: Non-occluding cerumen    Immittance Measures:  Right Ear: Type A -- indicating normal volume, pressure, and static compliance  Left Ear: Type A -- indicating normal volume, pressure, and static compliance    Acoustic Reflexes:  Right Ear: (Ipsilateral) Responses present at expected levels for 500-1000 Hz. Responses absent 2533-3969 Hz.    Left Ear: (Ipsilateral) Responses present at expected levels for 500-4000 Hz.    Pure Tone Audiometry:    Right Ear: Moderately-severe sensorineural hearing loss rising to a moderate sensorineural hearing loss    Left Ear: Hearing within normal limits sloping to a mild sensorineural hearing loss    Asymmetry: Yes, right ear poorer 125-8000 Hz    In comparison to previous audio completed on 18, his hearing sensitivity has remained stable in his right ear but his WRS has declined. His hearing sensitivity in his left ear has declined.      Reliability:   Good    Speech Audiometry:   Right: Speech Reception Threshold (SRT) was obtained at 65 dBHL w/ masking.   SRT/PTA in Good agreement with pure tone average.    Left: Speech Reception Threshold (SRT) was obtained at 10 dBHL.   SRT/PTA in Good agreement with pure tone average.    Word Recognition Scores  (WRS):  Right Ear: poor (16%) in quiet when words were presented at 85 dBHL w/ masking.   Left Ear: excellent (100%) in quiet when words were presented at 60 dBHL.     Asymmetry: Yes, right ear poorer      IMPRESSIONS:  Today's testing revealed normal ear canal volume, middle ear pressure, and tympanic membrane compliance. He has moderately-severe sensorineural hearing loss rising to a moderate sensorineural hearing loss in the right ear. In the left ear he has hearing within normal limits sloping to a mild sensorineural hearing loss. He has a noted asymmetry, right ear poorer 125-8000 Hz. In comparison to previous audio completed on 6/28/18, his hearing sensitivity has remained stable in his right ear but his WRS has declined. His hearing sensitivity in his left ear has declined. The results were discussed with the patient.     He is not a candidate for a traditional right hearing aid due to his poor word recognition score. He decided to pursue a BiCROS option. He would like to order a Phonak Audeo CROS (RE) and Audeo I70-R (LE) in the color P7 with a 1S  and medium open domes. He has a benefit through his insurance and would like to use it prior to the start of the new year. It will cover the devices in full up to $2500 every 3 years. No prior authorization required, does not have to go through [employer]. He does not want to complete a flex trial of the CROS first.       RECOMMENDATIONS:  1.) Continue medical follow up with Ears Nose and Throat (ENT) provider as recommended.  2.) Return for audiologic evaluation annually to monitor hearing sensitivity and assess middle ear status or sooner should concerns arise. The audiology department can be reached at [phone] to schedule an appointment.   3.) Return for hearing aid fitting as scheduled.       Cassy Christian, CCC-A  Clinical Audiologist      KEY  SNHL Sensorineural Hearing Loss   CHL Conductive Hearing Loss   MHL Mixed  Hearing Loss   SSNHL Sudden Sensorineural Hearing Loss   WNL Within Normal Limits   PTA Pure Tone Average   TM Tympanic Membrane   ECV Ear Canal Volume   SRT Speech Reception Threshold   WRS Word Recognition Score

## 2024-12-12 ENCOUNTER — CLINICAL SUPPORT (OUTPATIENT)
Dept: AUDIOLOGY | Facility: CLINIC | Age: 59
End: 2024-12-12
Payer: COMMERCIAL

## 2024-12-12 DIAGNOSIS — H90.3 BILATERAL SENSORINEURAL HEARING LOSS: Primary | ICD-10-CM

## 2024-12-12 PROCEDURE — V5240 DISP FEE CONTRALATERAL BINAU: HCPCS

## 2024-12-12 PROCEDURE — V5221 HEARING AID BINAURAL BTE/BTE: HCPCS

## 2024-12-12 ASSESSMENT — PAIN - FUNCTIONAL ASSESSMENT: PAIN_FUNCTIONAL_ASSESSMENT: 0-10

## 2024-12-12 ASSESSMENT — PAIN SCALES - GENERAL: PAINLEVEL_OUTOF10: 0 - NO PAIN

## 2024-12-12 NOTE — PROGRESS NOTES
"AUDIOLOGY HEARING AID FITTING      Name:  Tobi Dominguez   :  1965   Age:  59 y.o.   Date of Evaluation:  2024     Time: 945-5070    HISTORY:  Tobi arrived today for hearing aid fitting. Most recent audiologic evaluation performed on 11/15/24 revealed moderately-severe sensorineural hearing loss rising to a moderate sensorineural hearing loss in the right ear. In the left ear he has hearing within normal limits sloping to a mild sensorineural hearing loss. He has a known asymmetry right ear poorer 125-8000 Hz, which has been present since childhood. He previously wore a right hearing aid but did not perceive significant benefit. Patient received medical clearance to continue with amplification from Dr. Dallas Brown on 24.      HEARING AIDS:    Hearing Aid Information Right Left    Phonak Phonak   Model CROS I-R Audeo I70-R   Serial Number [id number]    1 C 1 S   Dome Medium Open Medium Open   Retention No No   Wax Traps Cerustop Cerustop     Repair Warranty 1/3/2028   Loss and Damage Warranty 1/3/2028   Fitting Date 2024    Fitting Audiologist Csasy Christian CCC-A  Clinical Audiologist       Paired to Phone for phone calls: No  Paired to smart phone application: Yes  Gain Level: 100%  Volume controls active: Yes  Fit to Audiogram performed on 24      HEARING AID ORITENTATION:  Low-battery and volume control signals were demonstrated for the patient.        VALIDATION MEASURES:  The Device Benefit Self-Assessment Scale was administered today pre-fitting. He will complete this assessment again at his 2 week hearing aid check. He noted that when answering the prompts he was considering his difficulty primary from when stimulus is on his right side, due to his noted asymmetry.     He finds his hearing difficulties to be severe and marked \"always\" on the majority of his responses.       IMPRESSIONS:  Today's 1-hour hearing aid fitting appointment was " spent discussing use, care, and maintenance of the hearing devices. The patient was able to properly insert and remove the hearing instrument from the ear. In addition to today's verbal instruction of the hearing devices, the patient was given written instructions from the hearing aid . Hearing aid limitations were discussed at length as well as realistic expectations. The patient was advised in order to receive full benefit of amplification, consistent use during all waking hours is recommended. The repair warranty (coverage/cost from the hearing aid  and not the responsibility of [facility]) and the conditions of the 30 day right-to-return period (ending 1/10/24). Recall, he did not complete a flex trial with the CROS. Pay close attention to the return period should he not find benefit with it.     He has a benefit through his insurance and would like to use it prior to the start of the new year. It will cover the devices in full up to $2500 every 3 years. No prior authorization required, does not have to go through [employer].       RECOMMENDATIONS:  Strive for full-time device use during waking hours, except when activities preclude device safety.  Return for hearing aid fitting follow-up appointment.  Schedule an appointment for follow-up every year. Call [phone] and request an Audio-Hearing Aid Check Combo appointment with Cassy Christian CCC-A.      PATIENT EDUCATION :  Discussed results and recommendations with Mr. Dmoinguez. Questions were addressed and the patient was encouraged to contact our department at [phone] should concerns arise.       Cassy Christian CCC-A  Clinical Audiologist

## 2024-12-23 DIAGNOSIS — J06.9 VIRAL UPPER RESPIRATORY TRACT INFECTION: ICD-10-CM

## 2024-12-23 RX ORDER — AZITHROMYCIN 250 MG/1
TABLET, FILM COATED ORAL
COMMUNITY
Start: 2024-02-12 | End: 2024-12-23 | Stop reason: SDUPTHER

## 2024-12-23 RX ORDER — AZITHROMYCIN 250 MG/1
TABLET, FILM COATED ORAL
Qty: 6 TABLET | Refills: 0 | Status: SHIPPED | OUTPATIENT
Start: 2024-12-23

## 2024-12-24 ENCOUNTER — CLINICAL SUPPORT (OUTPATIENT)
Dept: AUDIOLOGY | Facility: CLINIC | Age: 59
End: 2024-12-24

## 2024-12-24 NOTE — PROGRESS NOTES
"ADULT HEARING AID CHECK      Name:  Tobi Dominguez   :  1965   Age:  59 y.o.   Date of Evaluation:  2024     Time: 930-1000      HISTORY:  Tobi Dominguez is in for a hearing aid fitting follow-up. He was recently fit with a Phonak Audeo CROS I-R and Phonak Audeo I70-R. He reported doing well with the devices. He is getting more used to wearing something in the ear. He has been using his maine and trying to find what's most comfortable for him. He does not want any adjustments today.       HEARING AIDS:    Hearing Aid Information Right Left    Phonak Phonak   Model CROS I-R Audeo I70-R   Serial Number [id number]    1 C 1 S   Dome Medium Open Medium Open   Retention No No   Wax Traps Cerustop Cerustop      Repair Warranty 1/3/2028   Loss and Damage Warranty 1/3/2028   Fitting Date 2024    Fitting Audiologist Cassy Christian CCC-A  Clinical Audiologist         Paired to Phone for phone calls: No  Paired to smart phone application: Yes  Gain Level: 100%  Volume controls active: Yes  Fit to Audiogram performed on 24      VALIDATION MEASURE:  The Device Benefit Self-Assessment Scale was administered again today post-fitting. He noted that when answering the prompts he was considering his difficulty primary from when stimulus is on his right side, due to his noted asymmetry.      He previously found his hearing difficulties to be severe and marked \"always\" on the majority of his responses. Today, his difficulties were primarily marked as \"sometimes\", which notates a significant improvement.      IMPRESSIONS:  He would like to continue wearing the devices. He has a 30 day right to return period until 1/10/25. I encouraged him continue wearing the devices for all waking hours. We will check-in before 1/10 to make sure he is doing well. No cost associated with today's appointment as he is within his first 90 days of fitting.       RECOMMENDATIONS:   1.) Continue " full-time use of hearing aids.  2.) Contact your audiologist with any questions or concerns.      Cassy Christian, CCC-A  Clinical Audiologist

## 2024-12-28 DIAGNOSIS — K59.09 CHRONIC CONSTIPATION: ICD-10-CM

## 2024-12-30 RX ORDER — LINACLOTIDE 290 UG/1
CAPSULE, GELATIN COATED ORAL
Qty: 90 CAPSULE | Refills: 1 | Status: SHIPPED | OUTPATIENT
Start: 2024-12-30

## 2024-12-31 DIAGNOSIS — K59.09 CHRONIC CONSTIPATION: ICD-10-CM

## 2025-01-13 ENCOUNTER — APPOINTMENT (OUTPATIENT)
Facility: CLINIC | Age: 60
End: 2025-01-13
Payer: COMMERCIAL

## 2025-01-13 VITALS — HEIGHT: 67 IN | HEART RATE: 73 BPM | BODY MASS INDEX: 28.94 KG/M2 | OXYGEN SATURATION: 96 % | WEIGHT: 184.4 LBS

## 2025-01-13 DIAGNOSIS — Z12.11 COLON CANCER SCREENING: ICD-10-CM

## 2025-01-13 DIAGNOSIS — Z00.00 ROUTINE GENERAL MEDICAL EXAMINATION AT A HEALTH CARE FACILITY: Primary | ICD-10-CM

## 2025-01-13 RX ORDER — ZOLPIDEM TARTRATE 10 MG/1
10 TABLET ORAL NIGHTLY PRN
COMMUNITY
Start: 2024-12-05

## 2025-01-13 ASSESSMENT — ENCOUNTER SYMPTOMS
NERVOUS/ANXIOUS: 0
FLANK PAIN: 0
AGITATION: 0
ARTHRALGIAS: 0
SHORTNESS OF BREATH: 0
NUMBNESS: 0
TREMORS: 0
PALPITATIONS: 0
JOINT SWELLING: 0
FEVER: 0
FATIGUE: 0
HEMATURIA: 0
NEUROLOGICAL NEGATIVE: 1
EYE PAIN: 0
POLYDIPSIA: 0
HEMATOLOGIC/LYMPHATIC NEGATIVE: 1
APPETITE CHANGE: 0
BACK PAIN: 0
DIARRHEA: 0
DYSURIA: 0
CONFUSION: 0
BLOOD IN STOOL: 0
ALLERGIC/IMMUNOLOGIC NEGATIVE: 1
VOMITING: 0
DIZZINESS: 0
EYE REDNESS: 0
SORE THROAT: 0
BRUISES/BLEEDS EASILY: 0
ABDOMINAL PAIN: 0
DIFFICULTY URINATING: 0
APNEA: 0
HEADACHES: 0
GASTROINTESTINAL NEGATIVE: 1
COUGH: 0
WHEEZING: 0
SINUS PRESSURE: 0
CHEST TIGHTNESS: 0
MYALGIAS: 0
SLEEP DISTURBANCE: 0
NAUSEA: 0
FREQUENCY: 0
ADENOPATHY: 0
CONSTIPATION: 0

## 2025-01-13 ASSESSMENT — PAIN SCALES - GENERAL: PAINLEVEL_OUTOF10: 0-NO PAIN

## 2025-01-13 NOTE — PROGRESS NOTES
Physical Exam    Name Tobi Dominguez    Date of Service :1/13/2025      Tobi Dominguez  59 y.o. is here for an annual physical exam:    [facility]-11/24-Dr Lopez    Epistaxis    Mood disorder    Past Medical History:   Diagnosis Date    Arthritis     Asymmetric sensorineural deafness     Benign essential hypertension     Chronic kidney disease     Stage 1-    Chronic nasal congestion     daily mucinex, fonase prn.  seen for nose bleeds by ENT 10/2024    Depression with anxiety     Enlarged prostate with urinary obstruction     Plan: Holmium Laser Enucleation Transurethral Prostate 11/1/24    GERD (gastroesophageal reflux disease)     Gout     Hyperlipidemia     IBS (irritable bowel syndrome)     Insomnia     Nonalcoholic fatty liver disease     Obesity     on Mounjaro       Review of Systems   Constitutional:  Negative for appetite change, fatigue and fever.   HENT:  Negative for congestion, ear discharge, ear pain, hearing loss, mouth sores, postnasal drip, sinus pressure, sore throat and tinnitus.    Eyes:  Negative for pain and redness.   Respiratory:  Negative for apnea, cough, chest tightness, shortness of breath and wheezing.    Cardiovascular:  Negative for chest pain, palpitations and leg swelling.   Gastrointestinal: Negative.  Negative for abdominal pain, blood in stool, constipation, diarrhea, nausea and vomiting.   Endocrine: Negative for polydipsia and polyuria.   Genitourinary:  Negative for decreased urine volume, difficulty urinating, dysuria, enuresis, flank pain, frequency, hematuria, penile discharge, penile pain, scrotal swelling, testicular pain and urgency.   Musculoskeletal:  Negative for arthralgias, back pain, gait problem, joint swelling and myalgias.   Skin:  Negative for pallor and rash.   Allergic/Immunologic: Negative.    Neurological: Negative.  Negative for dizziness, tremors, syncope, numbness and headaches.   Hematological: Negative.  Negative for adenopathy. Does not bruise/bleed easily.  "  Psychiatric/Behavioral:  Negative for agitation, confusion and sleep disturbance. The patient is not nervous/anxious.    All other systems reviewed and are negative.          Past Surgical History:   Procedure Laterality Date    COLONOSCOPY  2011    COLONOSCOPY      CYSTOSCOPY  10/10/2024    SINUS SURGERY  1996    deviated septum    UMBILICAL HERNIA REPAIR  2015        Social History     Tobacco Use    Smoking status: Former     Current packs/day: 0.00     Average packs/day: 0.5 packs/day for 17.0 years (8.5 ttl pk-yrs)     Types: Cigarettes     Start date:      Quit date:      Years since quittin.0    Smokeless tobacco: Never   Vaping Use    Vaping status: Some Days    Substances: THC    Devices: Pre-filled or refillable cartridge   Substance Use Topics    Alcohol use: Yes     Alcohol/week: 3.0 standard drinks of alcohol     Types: 3 Shots of liquor per week     Comment: social    Drug use: Yes     Frequency: 2.0 times per week     Types: Marijuana        Social History     Social History Narrative    Not on file       Family History   Problem Relation Name Age of Onset    Lung cancer Mother      Other (Other) Father          Covid    No Known Problems Sister      No Known Problems Sister      No Known Problems Brother      Stomach cancer Maternal Grandfather      Heart attack Paternal Grandfather      Stroke Paternal Grandfather          Pulse 73   Ht 1.695 m (5' 6.73\")   Wt 83.6 kg (184 lb 6.4 oz)   SpO2 96%   BMI 29.11 kg/m²     Physical Exam  Vitals reviewed.   Constitutional:       Appearance: Normal appearance.   HENT:      Head: Normocephalic and atraumatic.      Right Ear: Tympanic membrane and ear canal normal.      Left Ear: Tympanic membrane and ear canal normal.      Ears:      Comments: Hearing Aids     Nose: Nose normal.      Mouth/Throat:      Mouth: Mucous membranes are moist.   Eyes:      Extraocular Movements: Extraocular movements intact.      " Conjunctiva/sclera: Conjunctivae normal.      Pupils: Pupils are equal, round, and reactive to light.   Cardiovascular:      Rate and Rhythm: Normal rate and regular rhythm.      Pulses: Normal pulses.      Heart sounds: Normal heart sounds. No murmur heard.     No friction rub. No gallop.   Pulmonary:      Effort: Pulmonary effort is normal. No respiratory distress.      Breath sounds: Normal breath sounds. No wheezing or rales.   Abdominal:      General: Abdomen is flat. Bowel sounds are normal. There is no distension.      Palpations: Abdomen is soft. There is no mass.      Tenderness: There is no abdominal tenderness. There is no guarding or rebound.      Hernia: No hernia is present.   Genitourinary:     Penis: Normal.       Testes: Normal.      Prostate: Normal.      Rectum: Normal. Guaiac result negative.   Musculoskeletal:         General: No swelling, tenderness or deformity. Normal range of motion.      Cervical back: Normal range of motion.   Skin:     General: Skin is warm.      Findings: No bruising, lesion or rash.   Neurological:      General: No focal deficit present.      Mental Status: He is alert and oriented to person, place, and time.      Sensory: No sensory deficit.      Motor: No weakness.      Coordination: Coordination normal.   Psychiatric:         Mood and Affect: Mood normal.         Behavior: Behavior normal.               Problem List Items Addressed This Visit    None      Assessment/Plan       [facility]-11/24-Dr Lopez    Epistaxis-Resolved    Mood disorder    EKG    Labs    [id number]-[facility] up CRC at [facility]

## 2025-01-14 ENCOUNTER — LAB (OUTPATIENT)
Dept: LAB | Facility: LAB | Age: 60
End: 2025-01-14
Payer: COMMERCIAL

## 2025-01-14 DIAGNOSIS — Z00.00 ANNUAL PHYSICAL EXAM: ICD-10-CM

## 2025-01-14 LAB
25(OH)D3 SERPL-MCNC: 49 NG/ML (ref 30–100)
ALBUMIN SERPL BCP-MCNC: 4.7 G/DL (ref 3.4–5)
ALP SERPL-CCNC: 75 U/L (ref 33–136)
ALT SERPL W P-5'-P-CCNC: 45 U/L (ref 10–52)
ANION GAP SERPL CALC-SCNC: 12 MMOL/L (ref 10–20)
APPEARANCE UR: CLEAR
AST SERPL W P-5'-P-CCNC: 28 U/L (ref 9–39)
BASOPHILS # BLD AUTO: 0.05 X10*3/UL (ref 0–0.1)
BASOPHILS NFR BLD AUTO: 0.9 %
BILIRUB SERPL-MCNC: 0.7 MG/DL (ref 0–1.2)
BILIRUB UR STRIP.AUTO-MCNC: NEGATIVE MG/DL
BUN SERPL-MCNC: 24 MG/DL (ref 6–23)
CALCIUM SERPL-MCNC: 10.1 MG/DL (ref 8.6–10.6)
CHLORIDE SERPL-SCNC: 103 MMOL/L (ref 98–107)
CHOLEST SERPL-MCNC: 252 MG/DL (ref 0–199)
CHOLESTEROL/HDL RATIO: 5.3
CO2 SERPL-SCNC: 30 MMOL/L (ref 21–32)
COLOR UR: NORMAL
CREAT SERPL-MCNC: 1.38 MG/DL (ref 0.5–1.3)
CRP SERPL HS-MCNC: 0.9 MG/L
EGFRCR SERPLBLD CKD-EPI 2021: 59 ML/MIN/1.73M*2
EOSINOPHIL # BLD AUTO: 0.34 X10*3/UL (ref 0–0.7)
EOSINOPHIL NFR BLD AUTO: 6.3 %
ERYTHROCYTE [DISTWIDTH] IN BLOOD BY AUTOMATED COUNT: 12.7 % (ref 11.5–14.5)
EST. AVERAGE GLUCOSE BLD GHB EST-MCNC: 114 MG/DL
GLUCOSE SERPL-MCNC: 95 MG/DL (ref 74–99)
GLUCOSE UR STRIP.AUTO-MCNC: NORMAL MG/DL
HBA1C MFR BLD: 5.6 %
HCT VFR BLD AUTO: 45.3 % (ref 41–52)
HDLC SERPL-MCNC: 47.9 MG/DL
HGB BLD-MCNC: 15.9 G/DL (ref 13.5–17.5)
IMM GRANULOCYTES # BLD AUTO: 0.01 X10*3/UL (ref 0–0.7)
IMM GRANULOCYTES NFR BLD AUTO: 0.2 % (ref 0–0.9)
KETONES UR STRIP.AUTO-MCNC: NEGATIVE MG/DL
LDLC SERPL CALC-MCNC: 163 MG/DL
LEUKOCYTE ESTERASE UR QL STRIP.AUTO: NEGATIVE
LYMPHOCYTES # BLD AUTO: 2.13 X10*3/UL (ref 1.2–4.8)
LYMPHOCYTES NFR BLD AUTO: 39.5 %
MCH RBC QN AUTO: 29.3 PG (ref 26–34)
MCHC RBC AUTO-ENTMCNC: 35.1 G/DL (ref 32–36)
MCV RBC AUTO: 84 FL (ref 80–100)
MONOCYTES # BLD AUTO: 0.64 X10*3/UL (ref 0.1–1)
MONOCYTES NFR BLD AUTO: 11.9 %
NEUTROPHILS # BLD AUTO: 2.22 X10*3/UL (ref 1.2–7.7)
NEUTROPHILS NFR BLD AUTO: 41.2 %
NITRITE UR QL STRIP.AUTO: NEGATIVE
NON HDL CHOLESTEROL: 204 MG/DL (ref 0–149)
NRBC BLD-RTO: 0 /100 WBCS (ref 0–0)
PH UR STRIP.AUTO: 5 [PH]
PLATELET # BLD AUTO: 209 X10*3/UL (ref 150–450)
POTASSIUM SERPL-SCNC: 4.5 MMOL/L (ref 3.5–5.3)
PROT SERPL-MCNC: 7.2 G/DL (ref 6.4–8.2)
PROT UR STRIP.AUTO-MCNC: NEGATIVE MG/DL
PSA SERPL-MCNC: 0.89 NG/ML
RBC # BLD AUTO: 5.42 X10*6/UL (ref 4.5–5.9)
RBC # UR STRIP.AUTO: NEGATIVE /UL
SODIUM SERPL-SCNC: 140 MMOL/L (ref 136–145)
SP GR UR STRIP.AUTO: 1.02
TRIGL SERPL-MCNC: 204 MG/DL (ref 0–149)
TSH SERPL-ACNC: 1.25 MIU/L (ref 0.44–3.98)
UROBILINOGEN UR STRIP.AUTO-MCNC: NORMAL MG/DL
VLDL: 41 MG/DL (ref 0–40)
WBC # BLD AUTO: 5.4 X10*3/UL (ref 4.4–11.3)

## 2025-01-14 PROCEDURE — 84443 ASSAY THYROID STIM HORMONE: CPT

## 2025-01-14 PROCEDURE — 80053 COMPREHEN METABOLIC PANEL: CPT

## 2025-01-14 PROCEDURE — 81003 URINALYSIS AUTO W/O SCOPE: CPT

## 2025-01-14 PROCEDURE — 80061 LIPID PANEL: CPT

## 2025-01-14 PROCEDURE — 85025 COMPLETE CBC W/AUTO DIFF WBC: CPT

## 2025-01-14 PROCEDURE — 84153 ASSAY OF PSA TOTAL: CPT

## 2025-01-14 PROCEDURE — 82306 VITAMIN D 25 HYDROXY: CPT

## 2025-01-14 PROCEDURE — 86141 C-REACTIVE PROTEIN HS: CPT

## 2025-01-14 PROCEDURE — 83036 HEMOGLOBIN GLYCOSYLATED A1C: CPT

## 2025-01-15 DIAGNOSIS — Z13.6 SCREENING FOR CARDIOVASCULAR CONDITION: Primary | ICD-10-CM

## 2025-02-18 ENCOUNTER — CLINICAL SUPPORT (OUTPATIENT)
Dept: AUDIOLOGY | Facility: CLINIC | Age: 60
End: 2025-02-18
Payer: COMMERCIAL

## 2025-02-18 DIAGNOSIS — H90.3 BILATERAL SENSORINEURAL HEARING LOSS: Primary | ICD-10-CM

## 2025-02-18 ASSESSMENT — PAIN SCALES - GENERAL: PAINLEVEL_OUTOF10: 0 - NO PAIN

## 2025-02-18 ASSESSMENT — PAIN - FUNCTIONAL ASSESSMENT: PAIN_FUNCTIONAL_ASSESSMENT: 0-10

## 2025-02-18 NOTE — PROGRESS NOTES
ADULT HEARING AID CHECK      Name:  Tobi Dominguez   :  1965   Age:  59 y.o.   Date of Evaluation:  2025     Time: 930-1000      HISTORY:  Tobi Dominguez is in for a hearing aid check. He noted his dog chewed his right device. He also noted some intermittent issues with bluetooth connection to the CROS through the maine.       HEARING AIDS:  Upon visual inspection his right device has teeth marks on it and the housing is cracked. However, the device does turn on and off in-office. It will be sent in for repair. He was fit with loaner devices until his returns. He is still in possession of his left hearing aid. He was re-paired to the maine with the loaner devices.       Hearing Aid Information Right Left    Phonak Phonak   Model CROS I-R Audeo I70-R   Serial Number [id number]    1 C 1 S   Dome Medium Open Medium Open   Retention No No   Wax Traps Cerustop Cerustop      Repair Warranty 1/3/2028   Loss and Damage Warranty 1/3/2028   Fitting Date 2024    Fitting Audiologist Cassy Christian CCC-A  Clinical Audiologist         Paired to Phone for phone calls: No  Paired to smart phone application: Yes  Gain Level: 100%  Volume controls active: Yes  Fit to Audiogram performed on 24      IMPRESSIONS:  His hearing aid will be sent in for repair. He will be in [place] until 3/3. When he returns we will fit him with his repaired device. He is still within the first 90 days of fitting, no charge for today's appointment.       RECOMMENDATIONS:  1.) Continue full-time use of hearing aids.  2.) Return for hearing aid pick-up when right device returns from repair.      Cassy Christian CCC-A  Clinical Audiologist

## 2025-03-03 ENCOUNTER — APPOINTMENT (OUTPATIENT)
Dept: AUDIOLOGY | Facility: CLINIC | Age: 60
End: 2025-03-03

## 2025-03-03 DIAGNOSIS — H90.3 BILATERAL SENSORINEURAL HEARING LOSS: Primary | ICD-10-CM

## 2025-03-03 ASSESSMENT — PAIN SCALES - GENERAL: PAINLEVEL_OUTOF10: 0 - NO PAIN

## 2025-03-03 ASSESSMENT — PAIN - FUNCTIONAL ASSESSMENT: PAIN_FUNCTIONAL_ASSESSMENT: 0-10

## 2025-03-06 ENCOUNTER — HOSPITAL ENCOUNTER (OUTPATIENT)
Dept: RADIOLOGY | Facility: CLINIC | Age: 60
Discharge: HOME | End: 2025-03-06
Payer: COMMERCIAL

## 2025-03-06 ENCOUNTER — OFFICE VISIT (OUTPATIENT)
Dept: ORTHOPEDIC SURGERY | Facility: CLINIC | Age: 60
End: 2025-03-06
Payer: COMMERCIAL

## 2025-03-06 VITALS — BODY MASS INDEX: 28.58 KG/M2 | WEIGHT: 181 LBS

## 2025-03-06 DIAGNOSIS — M79.673 PAIN OF FOOT, UNSPECIFIED LATERALITY: ICD-10-CM

## 2025-03-06 DIAGNOSIS — M20.41 HAMMER TOES OF BOTH FEET: ICD-10-CM

## 2025-03-06 DIAGNOSIS — M20.42 HAMMER TOES OF BOTH FEET: ICD-10-CM

## 2025-03-06 DIAGNOSIS — M19.071 ARTHRITIS OF BOTH FEET: Primary | ICD-10-CM

## 2025-03-06 DIAGNOSIS — M19.072 ARTHRITIS OF BOTH FEET: Primary | ICD-10-CM

## 2025-03-06 PROCEDURE — 99214 OFFICE O/P EST MOD 30 MIN: CPT | Performed by: ORTHOPAEDIC SURGERY

## 2025-03-06 PROCEDURE — 99204 OFFICE O/P NEW MOD 45 MIN: CPT | Performed by: ORTHOPAEDIC SURGERY

## 2025-03-06 PROCEDURE — 73630 X-RAY EXAM OF FOOT: CPT | Mod: 50

## 2025-03-06 PROCEDURE — 1036F TOBACCO NON-USER: CPT | Performed by: ORTHOPAEDIC SURGERY

## 2025-03-06 RX ORDER — CEFAZOLIN SODIUM 2 G/100ML
2 INJECTION, SOLUTION INTRAVENOUS ONCE
OUTPATIENT
Start: 2025-03-06 | End: 2025-03-06

## 2025-03-06 NOTE — PROGRESS NOTES
59-year-old male here for bilateral feet.  Has had bunions for many years.  Over the past year is having increasing pain mostly in the lesser toes.  He has tried toe separators and splints.  He is able to golf and do activities though he tends to ice down afterwards because of pain.  Has never had surgery.  Denies any injury to his feet.  He is contemplating surgery to fix his feet sometime later this year if possible.  No diabetes.  Non-smoker.    On exam:  WD/WN athletic male  A+O X3  NAD  No lymphedema  Inspection of both feet and ankles show symmetric arches.   Moderate symmetric bunion deformities.  Limited dorsiflexion first MTP joint.  Hyperextension IP joints bilateral.  Swollen left second MTP joint compared to right.  Tender left third toe DIP joint.  5/5 strength in all 4 planes.   Sensation intact to LT.   Good pulses.   Stable anterior drawer.  No peroneal subluxation.    (-) Yanna.     I personally reviewed the following radiographic exams: Weightbearing x-rays both feet shows moderate degenerative hallux valgus bilateral.  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  Degenerative left third toe DIP joint.    Assessment: Bilateral second MTP arthritis.  Bilateral degenerative hallux valgus.  Left third toe DIP arthritis.    Plan: Discussed nonoperative and operative options in detail.   Risk and benefits discussed in detail. All questions answered today.  Recovery timeline and expectations discussed in detail.  Discussed possible surgical options for both feet.  Given his age and activity level I would probably recommend first MTP fusion to stabilize the joint so bunion will not recur.  Discussed possible surgery on just the lesser toes though I think it is reasonable to go ahead and fix the great toes as well.  We discussed interposition arthroplasty for the second MTP joints and DIP joint fusion for the left third toe DIP joint.  We discussed  postop recovery in detail.  He will consider it in the fall.  Left first MTP fusion [id number], left second MTP arthroplasty 56201, left third toe DIP fusion [id number].  Arthrex first MTP set, K wires.  C-arm,.. General plus regional.  60 minutes  Crutches or Walker/ Consider knee scooter   postop shoe

## 2025-03-17 NOTE — PROGRESS NOTES
ADULT HEARING AID CHECK      Name:  Tobi Dominguez   :  1965   Age:  59 y.o.   Date of Evaluation:  3/3/2025     Time: 1330-03943      HISTORY:   Tobi Dominguez is in for a hearing aid pick-up. Recall, his right device was chewed by his dog and was sent in for repair.       HEARING AIDS:  His hearing aids were programmed back together with his current fitting. He was re-paired to his maine.     Hearing Aid Information Right Left    Phonak Phonak   Model CROS I-R [place] I70-R   Serial Number [id number]    1 C 1 S   Dome Medium Open Medium Open   Retention No No   Wax Traps Cerustop Cerustop      Repair Warranty 1/3/2028   Loss and Damage Warranty 1/3/2028   Fitting Date 2024    Fitting Audiologist Cassy Christian CCC-A  Clinical Audiologist         Paired to Phone for phone calls: No  Paired to smart phone application: Yes  Gain Level: 100%  Volume controls active: Yes  Fit to Audiogram performed on 24      IMPRESSIONS:  He is doing well with the devices and should return as needed for hearing aid checks. He was not charged for today's appointment as he is within the first 90 days of fitting.       RECOMMENDATIONS:  1.) Continue full-time use of hearing aids.  2.) Return as needed for hearing aid checks.       Cassy Christian CCC-A  Clinical Audiologist

## 2025-06-19 ENCOUNTER — APPOINTMENT (OUTPATIENT)
Dept: GASTROENTEROLOGY | Facility: HOSPITAL | Age: 60
End: 2025-06-19
Payer: COMMERCIAL

## 2025-07-03 DIAGNOSIS — K59.09 CHRONIC CONSTIPATION: ICD-10-CM

## 2025-07-18 ENCOUNTER — OFFICE VISIT (OUTPATIENT)
Dept: ORTHOPEDIC SURGERY | Facility: CLINIC | Age: 60
End: 2025-07-18
Payer: COMMERCIAL

## 2025-07-18 VITALS — HEIGHT: 68 IN | BODY MASS INDEX: 26.98 KG/M2 | WEIGHT: 178 LBS

## 2025-07-18 DIAGNOSIS — M19.071 ARTHRITIS OF BOTH FEET: ICD-10-CM

## 2025-07-18 DIAGNOSIS — M19.072 ARTHRITIS OF BOTH FEET: ICD-10-CM

## 2025-07-18 DIAGNOSIS — M20.42 HAMMER TOES OF BOTH FEET: Primary | ICD-10-CM

## 2025-07-18 DIAGNOSIS — M20.41 HAMMER TOES OF BOTH FEET: Primary | ICD-10-CM

## 2025-07-18 PROCEDURE — 3008F BODY MASS INDEX DOCD: CPT | Performed by: ORTHOPAEDIC SURGERY

## 2025-07-18 PROCEDURE — 99213 OFFICE O/P EST LOW 20 MIN: CPT | Performed by: ORTHOPAEDIC SURGERY

## 2025-07-18 NOTE — PROGRESS NOTES
Returns to discuss and schedule surgery for his left foot in the fall     Exam: Unchanged    I personally reviewed the following radiographic exams: Weightbearing x-rays both feet shows moderate degenerative hallux valgus bilateral.  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  Degenerative left third toe DIP joint.    Assessment: Bilateral second MTP arthritis.  Bilateral degenerative hallux valgus.  Left third toe DIP arthritis.    Plan: Discussed nonoperative and operative options in detail.   Risk and benefits discussed in detail. All questions answered today.  Recovery timeline and expectations discussed in detail.  Left first MTP fusion [id number], left second MTP arthroplasty 60803, left third toe DIP fusion [id number].  Arthrex first MTP set, K wires.  C-arm,.. General plus regional.  60 minutes  Crutches or Walker/ Consider knee scooter   postop shoe

## 2025-08-27 ENCOUNTER — TELEPHONE (OUTPATIENT)
Dept: PREADMISSION TESTING | Facility: HOSPITAL | Age: 60
End: 2025-08-27
Payer: COMMERCIAL

## 2025-08-29 ENCOUNTER — PRE-ADMISSION TESTING (OUTPATIENT)
Dept: PREADMISSION TESTING | Facility: HOSPITAL | Age: 60
End: 2025-08-29
Payer: COMMERCIAL

## 2025-08-29 VITALS
WEIGHT: 181 LBS | HEART RATE: 80 BPM | BODY MASS INDEX: 27.43 KG/M2 | DIASTOLIC BLOOD PRESSURE: 68 MMHG | TEMPERATURE: 97.7 F | SYSTOLIC BLOOD PRESSURE: 112 MMHG | RESPIRATION RATE: 12 BRPM | OXYGEN SATURATION: 98 % | HEIGHT: 68 IN

## 2025-08-29 DIAGNOSIS — I10 BENIGN ESSENTIAL HYPERTENSION: Primary | ICD-10-CM

## 2025-08-29 LAB
ALBUMIN SERPL BCP-MCNC: 4.5 G/DL (ref 3.4–5)
ALP SERPL-CCNC: 79 U/L (ref 33–136)
ALT SERPL W P-5'-P-CCNC: 27 U/L (ref 10–52)
ANION GAP SERPL CALC-SCNC: 10 MMOL/L (ref 10–20)
AST SERPL W P-5'-P-CCNC: 24 U/L (ref 9–39)
BASOPHILS # BLD AUTO: 0.06 X10*3/UL (ref 0–0.1)
BASOPHILS NFR BLD AUTO: 1.3 %
BILIRUB SERPL-MCNC: 0.8 MG/DL (ref 0–1.2)
BUN SERPL-MCNC: 16 MG/DL (ref 6–23)
CALCIUM SERPL-MCNC: 9.7 MG/DL (ref 8.6–10.3)
CHLORIDE SERPL-SCNC: 101 MMOL/L (ref 98–107)
CO2 SERPL-SCNC: 30 MMOL/L (ref 21–32)
CREAT SERPL-MCNC: 1.41 MG/DL (ref 0.5–1.3)
EGFRCR SERPLBLD CKD-EPI 2021: 57 ML/MIN/1.73M*2
EOSINOPHIL # BLD AUTO: 0.23 X10*3/UL (ref 0–0.7)
EOSINOPHIL NFR BLD AUTO: 5 %
ERYTHROCYTE [DISTWIDTH] IN BLOOD BY AUTOMATED COUNT: 12.8 % (ref 11.5–14.5)
GLUCOSE SERPL-MCNC: 81 MG/DL (ref 74–99)
HCT VFR BLD AUTO: 46.6 % (ref 41–52)
HGB BLD-MCNC: 15.5 G/DL (ref 13.5–17.5)
IMM GRANULOCYTES # BLD AUTO: 0.01 X10*3/UL (ref 0–0.7)
IMM GRANULOCYTES NFR BLD AUTO: 0.2 % (ref 0–0.9)
LYMPHOCYTES # BLD AUTO: 1.6 X10*3/UL (ref 1.2–4.8)
LYMPHOCYTES NFR BLD AUTO: 34.7 %
MCH RBC QN AUTO: 30.1 PG (ref 26–34)
MCHC RBC AUTO-ENTMCNC: 33.3 G/DL (ref 32–36)
MCV RBC AUTO: 91 FL (ref 80–100)
MONOCYTES # BLD AUTO: 0.5 X10*3/UL (ref 0.1–1)
MONOCYTES NFR BLD AUTO: 10.8 %
NEUTROPHILS # BLD AUTO: 2.21 X10*3/UL (ref 1.2–7.7)
NEUTROPHILS NFR BLD AUTO: 48 %
NRBC BLD-RTO: 0 /100 WBCS (ref 0–0)
PLATELET # BLD AUTO: 206 X10*3/UL (ref 150–450)
POTASSIUM SERPL-SCNC: 4.2 MMOL/L (ref 3.5–5.3)
PROT SERPL-MCNC: 6.7 G/DL (ref 6.4–8.2)
RBC # BLD AUTO: 5.15 X10*6/UL (ref 4.5–5.9)
SODIUM SERPL-SCNC: 137 MMOL/L (ref 136–145)
WBC # BLD AUTO: 4.6 X10*3/UL (ref 4.4–11.3)

## 2025-08-29 PROCEDURE — 99204 OFFICE O/P NEW MOD 45 MIN: CPT

## 2025-08-29 PROCEDURE — 85025 COMPLETE CBC W/AUTO DIFF WBC: CPT

## 2025-08-29 PROCEDURE — 80053 COMPREHEN METABOLIC PANEL: CPT

## 2025-08-29 ASSESSMENT — ENCOUNTER SYMPTOMS
ARTHRALGIAS: 1
CARDIOVASCULAR NEGATIVE: 1
GASTROINTESTINAL NEGATIVE: 1
NECK NEGATIVE: 1
ENDOCRINE NEGATIVE: 1
EYES NEGATIVE: 1
RESPIRATORY NEGATIVE: 1
CONSTITUTIONAL NEGATIVE: 1
NEUROLOGICAL NEGATIVE: 1

## 2025-09-04 ENCOUNTER — ANESTHESIA (OUTPATIENT)
Dept: OPERATING ROOM | Facility: HOSPITAL | Age: 60
End: 2025-09-04
Payer: COMMERCIAL

## 2025-09-04 ENCOUNTER — APPOINTMENT (OUTPATIENT)
Dept: RADIOLOGY | Facility: HOSPITAL | Age: 60
End: 2025-09-04
Payer: COMMERCIAL

## 2025-09-04 ENCOUNTER — PHARMACY VISIT (OUTPATIENT)
Dept: PHARMACY | Facility: CLINIC | Age: 60
End: 2025-09-04
Payer: COMMERCIAL

## 2025-09-04 ENCOUNTER — ANESTHESIA EVENT (OUTPATIENT)
Dept: OPERATING ROOM | Facility: HOSPITAL | Age: 60
End: 2025-09-04
Payer: COMMERCIAL

## 2025-09-04 ENCOUNTER — HOSPITAL ENCOUNTER (OUTPATIENT)
Facility: HOSPITAL | Age: 60
Setting detail: OUTPATIENT SURGERY
Discharge: HOME | End: 2025-09-04
Attending: ORTHOPAEDIC SURGERY | Admitting: ORTHOPAEDIC SURGERY
Payer: COMMERCIAL

## 2025-09-04 VITALS
HEART RATE: 70 BPM | DIASTOLIC BLOOD PRESSURE: 75 MMHG | WEIGHT: 176.59 LBS | OXYGEN SATURATION: 100 % | BODY MASS INDEX: 26.76 KG/M2 | SYSTOLIC BLOOD PRESSURE: 131 MMHG | HEIGHT: 68 IN | TEMPERATURE: 97.2 F | RESPIRATION RATE: 15 BRPM

## 2025-09-04 DIAGNOSIS — M20.42 HAMMER TOES OF BOTH FEET: ICD-10-CM

## 2025-09-04 DIAGNOSIS — M19.072 ARTHRITIS OF BOTH FEET: ICD-10-CM

## 2025-09-04 DIAGNOSIS — M19.072 ARTHRITIS OF LEFT FOOT: Primary | ICD-10-CM

## 2025-09-04 DIAGNOSIS — M19.071 ARTHRITIS OF BOTH FEET: ICD-10-CM

## 2025-09-04 DIAGNOSIS — M20.41 HAMMER TOES OF BOTH FEET: ICD-10-CM

## 2025-09-04 PROCEDURE — 7100000001 HC RECOVERY ROOM TIME - INITIAL BASE CHARGE: Performed by: ORTHOPAEDIC SURGERY

## 2025-09-04 PROCEDURE — 64447 NJX AA&/STRD FEMORAL NRV IMG: CPT

## 2025-09-04 PROCEDURE — 7100000010 HC PHASE TWO TIME - EACH INCREMENTAL 1 MINUTE: Performed by: ORTHOPAEDIC SURGERY

## 2025-09-04 PROCEDURE — 2720000007 HC OR 272 NO HCPCS: Performed by: ORTHOPAEDIC SURGERY

## 2025-09-04 PROCEDURE — 3600000003 HC OR TIME - INITIAL BASE CHARGE - PROCEDURE LEVEL THREE: Performed by: ORTHOPAEDIC SURGERY

## 2025-09-04 PROCEDURE — 2780000003 HC OR 278 NO HCPCS: Performed by: ORTHOPAEDIC SURGERY

## 2025-09-04 PROCEDURE — A28750 PR FUSION BIG TOE,MT-P JT: Performed by: STUDENT IN AN ORGANIZED HEALTH CARE EDUCATION/TRAINING PROGRAM

## 2025-09-04 PROCEDURE — 2500000004 HC RX 250 GENERAL PHARMACY W/ HCPCS (ALT 636 FOR OP/ED): Performed by: STUDENT IN AN ORGANIZED HEALTH CARE EDUCATION/TRAINING PROGRAM

## 2025-09-04 PROCEDURE — 7100000009 HC PHASE TWO TIME - INITIAL BASE CHARGE: Performed by: ORTHOPAEDIC SURGERY

## 2025-09-04 PROCEDURE — RXMED WILLOW AMBULATORY MEDICATION CHARGE

## 2025-09-04 PROCEDURE — 3700000001 HC GENERAL ANESTHESIA TIME - INITIAL BASE CHARGE: Performed by: ORTHOPAEDIC SURGERY

## 2025-09-04 PROCEDURE — 3700000002 HC GENERAL ANESTHESIA TIME - EACH INCREMENTAL 1 MINUTE: Performed by: ORTHOPAEDIC SURGERY

## 2025-09-04 PROCEDURE — 76000 FLUOROSCOPY <1 HR PHYS/QHP: CPT | Mod: LT

## 2025-09-04 PROCEDURE — 2500000004 HC RX 250 GENERAL PHARMACY W/ HCPCS (ALT 636 FOR OP/ED)

## 2025-09-04 PROCEDURE — 7100000002 HC RECOVERY ROOM TIME - EACH INCREMENTAL 1 MINUTE: Performed by: ORTHOPAEDIC SURGERY

## 2025-09-04 PROCEDURE — 2500000004 HC RX 250 GENERAL PHARMACY W/ HCPCS (ALT 636 FOR OP/ED): Performed by: ANESTHESIOLOGIST ASSISTANT

## 2025-09-04 PROCEDURE — A28750 PR FUSION BIG TOE,MT-P JT: Performed by: ANESTHESIOLOGIST ASSISTANT

## 2025-09-04 PROCEDURE — 64445 NJX AA&/STRD SCIATIC NRV IMG: CPT

## 2025-09-04 PROCEDURE — 3600000008 HC OR TIME - EACH INCREMENTAL 1 MINUTE - PROCEDURE LEVEL THREE: Performed by: ORTHOPAEDIC SURGERY

## 2025-09-04 PROCEDURE — C1713 ANCHOR/SCREW BN/BN,TIS/BN: HCPCS | Performed by: ORTHOPAEDIC SURGERY

## 2025-09-04 PROCEDURE — C1769 GUIDE WIRE: HCPCS | Performed by: ORTHOPAEDIC SURGERY

## 2025-09-04 RX ORDER — DIPHENHYDRAMINE HYDROCHLORIDE 50 MG/ML
12.5 INJECTION, SOLUTION INTRAMUSCULAR; INTRAVENOUS ONCE AS NEEDED
Status: DISCONTINUED | OUTPATIENT
Start: 2025-09-04 | End: 2025-09-04 | Stop reason: HOSPADM

## 2025-09-04 RX ORDER — ONDANSETRON HYDROCHLORIDE 2 MG/ML
INJECTION, SOLUTION INTRAVENOUS AS NEEDED
Status: DISCONTINUED | OUTPATIENT
Start: 2025-09-04 | End: 2025-09-04

## 2025-09-04 RX ORDER — KETOROLAC TROMETHAMINE 30 MG/ML
INJECTION, SOLUTION INTRAMUSCULAR; INTRAVENOUS AS NEEDED
Status: DISCONTINUED | OUTPATIENT
Start: 2025-09-04 | End: 2025-09-04

## 2025-09-04 RX ORDER — ROPIVACAINE HYDROCHLORIDE 5 MG/ML
INJECTION, SOLUTION EPIDURAL; INFILTRATION; PERINEURAL AS NEEDED
Status: DISCONTINUED | OUTPATIENT
Start: 2025-09-04 | End: 2025-09-04

## 2025-09-04 RX ORDER — PROPOFOL 10 MG/ML
INJECTION, EMULSION INTRAVENOUS AS NEEDED
Status: DISCONTINUED | OUTPATIENT
Start: 2025-09-04 | End: 2025-09-04

## 2025-09-04 RX ORDER — PHENYLEPHRINE HCL IN 0.9% NACL 1 MG/10 ML
SYRINGE (ML) INTRAVENOUS AS NEEDED
Status: DISCONTINUED | OUTPATIENT
Start: 2025-09-04 | End: 2025-09-04

## 2025-09-04 RX ORDER — DOCUSATE SODIUM 100 MG/1
100 CAPSULE, LIQUID FILLED ORAL 2 TIMES DAILY
Qty: 28 CAPSULE | Refills: 0 | Status: SHIPPED | OUTPATIENT
Start: 2025-09-04 | End: 2025-09-18

## 2025-09-04 RX ORDER — ROPIVACAINE HYDROCHLORIDE 5 MG/ML
INJECTION, SOLUTION EPIDURAL; INFILTRATION; PERINEURAL
Status: COMPLETED | OUTPATIENT
Start: 2025-09-04 | End: 2025-09-04

## 2025-09-04 RX ORDER — FENTANYL CITRATE 50 UG/ML
INJECTION, SOLUTION INTRAMUSCULAR; INTRAVENOUS AS NEEDED
Status: DISCONTINUED | OUTPATIENT
Start: 2025-09-04 | End: 2025-09-04

## 2025-09-04 RX ORDER — LABETALOL HYDROCHLORIDE 5 MG/ML
5 INJECTION, SOLUTION INTRAVENOUS ONCE AS NEEDED
Status: DISCONTINUED | OUTPATIENT
Start: 2025-09-04 | End: 2025-09-04 | Stop reason: HOSPADM

## 2025-09-04 RX ORDER — LIDOCAINE HYDROCHLORIDE 10 MG/ML
0.1 INJECTION, SOLUTION EPIDURAL; INFILTRATION; INTRACAUDAL; PERINEURAL ONCE
Status: DISCONTINUED | OUTPATIENT
Start: 2025-09-04 | End: 2025-09-04 | Stop reason: HOSPADM

## 2025-09-04 RX ORDER — ONDANSETRON HYDROCHLORIDE 2 MG/ML
4 INJECTION, SOLUTION INTRAVENOUS ONCE AS NEEDED
Status: DISCONTINUED | OUTPATIENT
Start: 2025-09-04 | End: 2025-09-04 | Stop reason: HOSPADM

## 2025-09-04 RX ORDER — CEFAZOLIN 1 G/1
INJECTION, POWDER, FOR SOLUTION INTRAVENOUS AS NEEDED
Status: DISCONTINUED | OUTPATIENT
Start: 2025-09-04 | End: 2025-09-04

## 2025-09-04 RX ORDER — OXYCODONE HYDROCHLORIDE 5 MG/1
5 TABLET ORAL EVERY 4 HOURS PRN
Status: DISCONTINUED | OUTPATIENT
Start: 2025-09-04 | End: 2025-09-04 | Stop reason: HOSPADM

## 2025-09-04 RX ORDER — OXYCODONE HYDROCHLORIDE 5 MG/1
5 TABLET ORAL EVERY 6 HOURS PRN
Qty: 28 TABLET | Refills: 0 | Status: SHIPPED | OUTPATIENT
Start: 2025-09-04 | End: 2025-09-11

## 2025-09-04 RX ORDER — MIDAZOLAM HYDROCHLORIDE 1 MG/ML
INJECTION, SOLUTION INTRAMUSCULAR; INTRAVENOUS AS NEEDED
Status: DISCONTINUED | OUTPATIENT
Start: 2025-09-04 | End: 2025-09-04

## 2025-09-04 RX ORDER — NAPROXEN SODIUM 220 MG/1
81 TABLET, FILM COATED ORAL 2 TIMES DAILY
Qty: 28 TABLET | Refills: 0 | Status: SHIPPED | OUTPATIENT
Start: 2025-09-04 | End: 2025-09-18

## 2025-09-04 RX ORDER — LIDOCAINE HYDROCHLORIDE 20 MG/ML
INJECTION, SOLUTION INFILTRATION; PERINEURAL AS NEEDED
Status: DISCONTINUED | OUTPATIENT
Start: 2025-09-04 | End: 2025-09-04

## 2025-09-04 RX ORDER — SODIUM CHLORIDE, SODIUM LACTATE, POTASSIUM CHLORIDE, CALCIUM CHLORIDE 600; 310; 30; 20 MG/100ML; MG/100ML; MG/100ML; MG/100ML
100 INJECTION, SOLUTION INTRAVENOUS CONTINUOUS
Status: DISCONTINUED | OUTPATIENT
Start: 2025-09-04 | End: 2025-09-04 | Stop reason: HOSPADM

## 2025-09-04 RX ADMIN — ROPIVACAINE HYDROCHLORIDE 20 ML: 5 INJECTION, SOLUTION EPIDURAL; INFILTRATION; PERINEURAL at 08:29

## 2025-09-04 RX ADMIN — SODIUM CHLORIDE, POTASSIUM CHLORIDE, SODIUM LACTATE AND CALCIUM CHLORIDE: 600; 310; 30; 20 INJECTION, SOLUTION INTRAVENOUS at 09:10

## 2025-09-04 RX ADMIN — PROPOFOL 200 MG: 10 INJECTION, EMULSION INTRAVENOUS at 09:18

## 2025-09-04 RX ADMIN — CEFAZOLIN 2 G: 1 INJECTION, POWDER, FOR SOLUTION INTRAMUSCULAR; INTRAVENOUS at 09:20

## 2025-09-04 RX ADMIN — KETOROLAC TROMETHAMINE 15 MG: 30 INJECTION, SOLUTION INTRAMUSCULAR at 10:42

## 2025-09-04 RX ADMIN — MIDAZOLAM HYDROCHLORIDE 2 MG: 1 INJECTION, SOLUTION INTRAMUSCULAR; INTRAVENOUS at 08:25

## 2025-09-04 RX ADMIN — LIDOCAINE HYDROCHLORIDE 100 MG: 20 INJECTION, SOLUTION INFILTRATION; PERINEURAL at 09:18

## 2025-09-04 RX ADMIN — FENTANYL CITRATE 100 MCG: 50 INJECTION, SOLUTION INTRAMUSCULAR; INTRAVENOUS at 08:25

## 2025-09-04 RX ADMIN — FENTANYL CITRATE 25 MCG: 50 INJECTION, SOLUTION INTRAMUSCULAR; INTRAVENOUS at 09:18

## 2025-09-04 RX ADMIN — ONDANSETRON 4 MG: 2 INJECTION, SOLUTION INTRAMUSCULAR; INTRAVENOUS at 10:42

## 2025-09-04 RX ADMIN — DEXAMETHASONE SODIUM PHOSPHATE 4 MG: 4 INJECTION, SOLUTION INTRA-ARTICULAR; INTRALESIONAL; INTRAMUSCULAR; INTRAVENOUS; SOFT TISSUE at 08:29

## 2025-09-04 RX ADMIN — DEXAMETHASONE SODIUM PHOSPHATE 10 MG: 4 INJECTION, SOLUTION INTRA-ARTICULAR; INTRALESIONAL; INTRAMUSCULAR; INTRAVENOUS; SOFT TISSUE at 09:20

## 2025-09-04 RX ADMIN — Medication 100 MCG: at 10:10

## 2025-09-04 RX ADMIN — Medication 100 MCG: at 09:49

## 2025-09-04 RX ADMIN — DEXAMETHASONE SODIUM PHOSPHATE 4 MG: 4 INJECTION, SOLUTION INTRA-ARTICULAR; INTRALESIONAL; INTRAMUSCULAR; INTRAVENOUS; SOFT TISSUE at 08:25

## 2025-09-04 RX ADMIN — ROPIVACAINE HYDROCHLORIDE 30 ML: 5 INJECTION, SOLUTION EPIDURAL; INFILTRATION; PERINEURAL at 08:25

## 2025-09-04 ASSESSMENT — PAIN - FUNCTIONAL ASSESSMENT
PAIN_FUNCTIONAL_ASSESSMENT: 0-10

## 2025-09-04 ASSESSMENT — PAIN SCALES - GENERAL
PAINLEVEL_OUTOF10: 0 - NO PAIN

## (undated) DEVICE — BLADE, ROTATION MORCELLATOR, 4.8MM X 385MM, PIRHANA, DISPOSABLE

## (undated) DEVICE — SYRINGE, 60 CC, IRRIGATION, PISTON, CATH TIP, W/LUER ADAPTER,DISP

## (undated) DEVICE — Device

## (undated) DEVICE — CATHETER, URETHRAL, FOLEY, 3 WAY, BARDEX IC, 22 FR, 30 CC, SILVER LATEX

## (undated) DEVICE — TUBING, SUCTION, CONNECTING, STERILE 0.25 X 120 IN., LF

## (undated) DEVICE — TUBING, MORCELLATOR PUMP, DISPOSABLE

## (undated) DEVICE — IRRIGATION SET, CYSTOSCOPY, TURP, Y, CONTINUOUS, 81 IN

## (undated) DEVICE — PLUG, CATHETER

## (undated) DEVICE — SYRINGE, 50 CC, LUER LOCK

## (undated) DEVICE — COLLECTION BAG, FLUID, NON-STERILE

## (undated) DEVICE — ADAPTER, Y TUBING STERILE

## (undated) DEVICE — EVACUATOR, UROVAC

## (undated) DEVICE — TOWEL, SURGICAL, NEURO, O/R, 16 X 26, BLUE, STERILE